# Patient Record
Sex: FEMALE | Race: WHITE | Employment: UNEMPLOYED | URBAN - METROPOLITAN AREA
[De-identification: names, ages, dates, MRNs, and addresses within clinical notes are randomized per-mention and may not be internally consistent; named-entity substitution may affect disease eponyms.]

---

## 2017-10-31 ENCOUNTER — TRANSFERRED RECORDS (OUTPATIENT)
Dept: HEALTH INFORMATION MANAGEMENT | Facility: CLINIC | Age: 52
End: 2017-10-31

## 2017-11-15 ENCOUNTER — OFFICE VISIT (OUTPATIENT)
Dept: NEUROLOGY | Facility: CLINIC | Age: 52
End: 2017-11-15

## 2017-11-15 VITALS
HEIGHT: 67 IN | RESPIRATION RATE: 20 BRPM | BODY MASS INDEX: 24.36 KG/M2 | DIASTOLIC BLOOD PRESSURE: 73 MMHG | WEIGHT: 155.2 LBS | HEART RATE: 88 BPM | SYSTOLIC BLOOD PRESSURE: 127 MMHG | OXYGEN SATURATION: 97 % | TEMPERATURE: 97.7 F

## 2017-11-15 DIAGNOSIS — R10.12 ABDOMINAL PAIN, LEFT UPPER QUADRANT: ICD-10-CM

## 2017-11-15 DIAGNOSIS — F41.9 ANXIETY: Primary | ICD-10-CM

## 2017-11-15 RX ORDER — CHOLECALCIFEROL (VITAMIN D3) 25 MCG
1 CAPSULE ORAL
COMMUNITY

## 2017-11-15 RX ORDER — IBUPROFEN 200 MG
400 TABLET ORAL EVERY 8 HOURS PRN
COMMUNITY
End: 2019-08-14

## 2017-11-15 ASSESSMENT — ENCOUNTER SYMPTOMS
WEIGHT LOSS: 0
DISTURBANCES IN COORDINATION: 0
LOSS OF CONSCIOUSNESS: 0
MYALGIAS: 0
POLYPHAGIA: 0
PALPITATIONS: 1
DIARRHEA: 1
PANIC: 1
NAUSEA: 0
EYE REDNESS: 1
DECREASED CONCENTRATION: 1
INSOMNIA: 1
ABDOMINAL PAIN: 0
HYPERTENSION: 0
WEIGHT GAIN: 0
HOT FLASHES: 0
MEMORY LOSS: 0
MUSCLE CRAMPS: 1
PARALYSIS: 0
SEIZURES: 0
NERVOUS/ANXIOUS: 1
WEAKNESS: 0
NUMBNESS: 0
HALLUCINATIONS: 0
CONSTIPATION: 1
DIZZINESS: 1
TREMORS: 0
ARTHRALGIAS: 0
EYE IRRITATION: 0
LIGHT-HEADEDNESS: 0
NECK PAIN: 0
VOMITING: 0
EYE WATERING: 0
SLEEP DISTURBANCES DUE TO BREATHING: 0
DECREASED LIBIDO: 0
BLOATING: 0
NIGHT SWEATS: 0
HYPOTENSION: 0
MUSCLE WEAKNESS: 0
FEVER: 0
CHILLS: 0
EXERCISE INTOLERANCE: 0
LEG PAIN: 0
DOUBLE VISION: 0
BLOOD IN STOOL: 0
EYE PAIN: 0
SYNCOPE: 0
JAUNDICE: 0
SPEECH CHANGE: 0
RECTAL PAIN: 0
POLYDIPSIA: 0
STIFFNESS: 1
BACK PAIN: 0
HEADACHES: 1
DECREASED APPETITE: 1
ORTHOPNEA: 0
INCREASED ENERGY: 1
HEARTBURN: 0
DEPRESSION: 0
JOINT SWELLING: 0
TINGLING: 1
FATIGUE: 1
ALTERED TEMPERATURE REGULATION: 0
BOWEL INCONTINENCE: 0

## 2017-11-15 ASSESSMENT — PAIN SCALES - GENERAL: PAINLEVEL: MILD PAIN (3)

## 2017-11-15 NOTE — NURSING NOTE
Chief Complaint   Patient presents with     Consult     UMP- MUSCLE SPASMS IN ABDOMINAL AREA AND BOTH LEGS     Jose Laureano, CMA

## 2017-11-15 NOTE — MR AVS SNAPSHOT
After Visit Summary   11/15/2017    America Woo    MRN: [id number]           Patient Information     Date Of Birth          1965        Visit Information        Provider Department      11/15/2017 2:15 PM Nancy Rhodes MD [facility] Neurology        Today's Diagnoses     Anxiety    -  1    Abdominal pain, left upper quadrant          Care Instructions    Schedule an appointment with Psychiatry.    Schedule an appointment with Gastroenterology.    Follow up in General Neurology clinic after these are completed.          Follow-ups after your visit        Additional Services     GASTROENTEROLOGY ADULT REF CONSULT ONLY       Preferred Location: [facility] [phone]      Please be aware that coverage of these services is subject to the terms and limitations of your health insurance plan.  Call member services at your health plan with any benefit or coverage questions.  Any procedures must be performed at a [facility] facility OR coordinated by your clinic's referral office.    Please bring the following with you to your appointment:    (1) Any X-Rays, CTs or MRIs which have been performed.  Contact the facility where they were done to arrange for  prior to your scheduled appointment.    (2) List of current medications   (3) This referral request   (4) Any documents/labs given to you for this referral            MENTAL HEALTH REFERRAL  - Adult; Psychiatry and Medication Management; Psychiatry       All scheduling is subject to the client's specific insurance plan & benefits, provider/location availability, and provider clinical specialities.  Please arrive 15 minutes early for your first appointment and bring your completed paperwork.    Please be aware that coverage of these services is subject to the terms and limitations of your health insurance plan.  Call member services at your health plan with any benefit or coverage questions.                  Follow-up notes from  "your care team     Return in about 4 months (around 3/15/2018).      Your next 10 appointments already scheduled     2018  3:30 PM CDT   (Arrive by 3:15 PM)   Return Visit with Nancy Rhodes MD   [facility] Neurology ([facility] [place] [facility])    [address] [place] [address] [id number] [phone]              Who to contact     Please call your clinic at [phone] to:    Ask questions about your health    Make or cancel appointments    Discuss your medicines    Learn about your test results    Speak to your doctor   If you have compliments or concerns about an experience at your clinic, or if you wish to file a complaint, please contact [facility] Patient Relations at [phone] or email us at [email or web address]         Additional Information About Your Visit        [employer] Information     [employer] is an electronic gateway that provides easy, online access to your medical records. With [employer], you can request a clinic appointment, read your test results, renew a prescription or communicate with your care team.     To sign up for [employer] visit the website at [email or web address]   You will be asked to enter the access code listed below, as well as some personal information. Please follow the directions to create your username and password.     Your access code is: [email or web address]  Expires: 2018  2:21 PM     Your access code will  in 90 days. If you need help or a new code, please contact your [facility] Clinic or call [phone] for assistance.        Care EveryWhere ID     This is your Care EveryWhere ID. This could be used by other organizations to access your [place] medical records  [id number]        Your Vitals Were     Pulse Temperature Respirations Height Pulse Oximetry Breastfeeding?    88 97.7  F (36.5  C) (Oral) 20 1.702 m (5' 7\") 97% No    BMI (Body Mass Index)             "       24.31 kg/m2            Blood Pressure from Last 3 Encounters:   11/15/17 127/73    Weight from Last 3 Encounters:   11/15/17 70.4 kg (155 lb 3.2 oz)              We Performed the Following     GASTROENTEROLOGY ADULT REF CONSULT ONLY     MENTAL HEALTH REFERRAL  - Adult; Psychiatry and Medication Management; Psychiatry        Primary Care Provider Office Phone # Fax #    Therese Oviedo [phone]       [place] [address] [address] [facility] [address]        Equal Access to Services     JOHNY HEAD : Hadii aad ku hadasho Soomaali, waaxda luqadaha, qaybta kaalmada adeegyada, waxay idiin hayaan adeeg patrickaragasper lakeyon . So [facility] [phone].    ATENCIÓN: Si habla español, tiene a negron disposición servicios gratuitos de asistencia lingüística. [facility] [phone].    We comply with applicable federal civil rights laws and [place] laws. We do not discriminate on the basis of race, color, national origin, age, disability, sex, sexual orientation, or gender identity.            Thank you!     Thank you for choosing [facility] NEUROLOGY  for your care. Our goal is always to provide you with excellent care. Hearing back from our patients is one way we can continue to improve our services. Please take a few minutes to complete the written survey that you may receive in the mail after your visit with us. Thank you!             Your Updated Medication List - Protect others around you: Learn how to safely use, store and throw away your medicines at www.disposemymeds.org.          This list is accurate as of: 11/15/17  4:36 PM.  Always use your most recent med list.                   Brand Name Dispense Instructions for use Diagnosis    ibuprofen 200 MG tablet    ADVIL/MOTRIN     Take 400 mg by mouth every 8 hours as needed for mild pain        MULTIVITAMIN & MINERAL PO      Take 1 tablet by mouth three times a week        Vitamin D-3 1000 UNITS Caps      Take 1 capsule by mouth four times a week

## 2017-11-15 NOTE — PROGRESS NOTES
"  DEPARTMENT OF NEUROLOGY    Patient Name:  America Woo  MRN:  [id number]    :  1965  Date of Clinic Visit:  2017  Primary Care Provider:  Therese Oviedo    CHIEF COMPLAINT: abdominal pain    HISTORY OF PRESENT ILLNESS:  America Woo is a 52 year old female with history of anxiety who presents to general neurology clinic for nine months of multiple symptoms.    She says that what she has noticed all started after she had the flu in January. She was not tested, but she has had it in the past and says it was the same and she spoke with her PCP on the phone and they thought this was the flu as well. After 2 weeks of that, in 2017 she experienced 3 weeks of stomach pain, of a spasming, burning quality. She was also very anxious, and had low back pain during this time that radiated to the right and around into her groin and abdomen. She thought at the time she had kidney stones, and says she was evaluated and found to have kidney stones, but she was not in the process of passing them. She then got shingles (in her R pubic region, R lower back, and R leg, all of which did not cross the midline, some of which blistered and crusted) then one month later, she had two small equivocal spots on her L shoulder which did not blister or crust. After this, she also experienced 4 individual instances of a feeling of \"electric shock\" that lasted seconds or even shorter, in back of her L arm. During this entire period, she says her bowel habits were normal, then when it all resolved she started to experience \"terrible constipation.\"    By summer 2017 she says she experienced vertigo (which she described as feeling of imbalance or drunkeness) with motion and activity, that would go away if she stopped whatever activity she was taking part in, and if she kept going, it would continue and she would then get a migraine. This was over the L eye, nonradiating, and she would be sensitive to " "fluorescent lights, and have a constant pressure type pain. This ended in September 2017, and she says she was not a person who had headaches prior.     Now, since November 5 she says she has had more stomach spasming pain, muscle spasms and twitches in both legs, R>L, between her ribs, in her arms, a heat sensation of L arm for a second at a time, and floaters in both of her eyes when showering. She says the spasming comes and goes, that she can feel well for many hours of the day, or she can wake up with it, or it will develop over the day, or sometimes it wakes her up at 4 am. She says she is now stooling ~4 times right away in am, soft, appearance is normal (no blood, dark, or tarry stools), and there is some relief of the spasmodic pain afterward. She denies any nausea or vomiting. She says the twitching has been constant for the past three days and is mostly around the medial aspect of her R knee. She also says she feels shaky all over since the knee twitching started.    She says she has been under a \"ton of stress\" lately and around and before the time of onset of these symptoms. She says her elderly mother has been very ill, she fell and broke her hip and pelvis, has an eating disorder, and her father is \"in denial\" about whether she does or needs to be in assisted living. The patient admits openly that she \"did not deal with [the stress] well\" but has stepped back and is allowing another sibling to take the main role of caregiver in this situation.     She describes her sleep as \"horrible\" and that it has been this way since a least January 2017. She says she lays down at 2100, falls asleep within 10 minutes, and then awakens after 1-2 hours, and repeats this every 1-2 hours all night, awakening around 4 times nightly, then wakes up at 0430, and does not feel rested. Her  says she is a light snorer, maybe, but usually not at all, they both deny any apneic spells. She says she has trouble getting " back to sleep in the middle of the night due to racing thoughts. She reports night terrors, since she was very small, that were better for many years, but have recurred since her sleep has been worse since 1/2017.     She says she is not in a good mood because she does not feel good. She says she always feels anxious, but more so since February. She reports taking doxepin in the 90s for about six months, but felt better after a period of time and was able to stop taking it. She reports panic attacks historically, and recently, maybe twice over the summer which are manifest as heart racing, dizziness, and feeling like she was going to die. She says she has been more fatigued in the past year, she realized that she goes to work, and goes to bed and that's it. She works as a  at a grocery store.    She was seen by her PCP regarding the vertigo and migraine, but these seemed to go away on their own. She was seen by ENT who recommended Neurology consultation, but did not go to that original appointment because the dizziness stopped. She was given a bite splint for bruxism.    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    Drinks rarely  No tobacco  No drugs    Mom- CMT (feet and legs), essential tremor, depression  Dad- stroke, polio, depression  5 siblings and a half brother, in good health  Three children, in good health    ASSESSMENT AND PLAN:  #abdominal pain  #paresthesias  #insomnia  #anxiety  No clear organic neurologic disorder can unify these problems, however untreated anxiety could be a common etiology. Also, consider mixed IBS with regard to GI symptoms.    Plan:  - Psychiatry consultation  - Gastroenterology consultation  - Follow up in General Neurology after the above have occurred.    Patient was seen and discussed with Dr. Duncan.    Nancy hRodes MD  PGY-2 Resident  [facility] Department of Neurology  Pager: [phone]    PHYSICAL EXAMINATION:  Vitals: /73   "Pulse 88  Temp 97.7  F (36.5  C) (Oral)  Resp 20  Ht 1.702 m (5' 7\")  Wt 70.4 kg (155 lb 3.2 oz)  SpO2 97%  Breastfeeding? No  BMI 24.31 kg/m2  General: adult female patient, seated on chair, NAD, accompanied by   HEENT: at/nc, oropharynx clear, mucosa moist  Cardiac: RRR, no m/r/g  Pulmonary: CTAB, nonlabored on RA  Abdomen: soft, nontender, nondistended, BS+  Extremities: warm, dry, w/o edema  Skin: no jaundice or rash  Psych: pleasant affect and congruent mood, thought content w/o abnormality, process linear and logical  Neuro: fundi benign   Mental status: alert and oriented to person, place, and time; language is fluent with intact comprehension and naming   Cranial nerves: VFF, PERRL, EOMI, no facial asymmetry, facial sensation intact, tongue and uvula are midline, no dysarthria   Motor: No abnormal posture, tone, atrophy, or movements/fasciculations.    Biceps Triceps Deltoid Hip flexor Knee extension Knee flexion Ankle extension Ankle flexion   Right 5 5 5 5 5 5 5 5 5   Left 5 5 5 5 5 5 5 5 5    Reflexes: Absent Babinski. Absent Clemons.   Brachioradialis Biceps Triceps Patellar Achilles   Right 2+ 2+ 2+ 2+ 2+   Left 2+ 2+ 2+ 2+ 2+    Sensory: Intact to light touch, pin prick, and vibration in all extremities. Romberg exam within normal limits.   Coordination: Intact FNF and heel-shin. No Dysmetria. No Dysdiadochokinesia.   Gait: Normal width, stride length, turn, and arm swing.    INVESTIGATIONS:   None obtained    REVIEW OF SYSTEMS: 12-point RoS negative except as per HPI.    ALLERGIES:  Allergies   Allergen Reactions     Epinephrine      Other reaction(s): Other  Shaking violenly.     Pollen Extract      MEDICATIONS:  Current Outpatient Prescriptions   Medication Sig Dispense Refill     Multiple Vitamins-Minerals (MULTIVITAMIN & MINERAL PO) Take 1 tablet by mouth three times a week       Cholecalciferol (VITAMIN D-3) 1000 UNITS CAPS Take 1 capsule by mouth four times a week       " ibuprofen (ADVIL/MOTRIN) 200 MG tablet Take 400 mg by mouth every 8 hours as needed for mild pain       PAST MEDICAL HISTORY:  No past medical history on file.  PAST SURGICAL HISTORY:  No past surgical history on file.  SOCIAL HISTORY:  Social History     Social History     Marital status:      Spouse name: N/A     Number of children: N/A     Years of education: N/A     Occupational History     Not on file.     Social History Main Topics     Smoking status: Never Smoker     Smokeless tobacco: Never Used     Alcohol use 1.2 oz/week     2 Standard drinks or equivalent per week      Comment: MONTHLY     Drug use: No     Sexual activity: Yes     Partners: Male     Other Topics Concern     Not on file     Social History Narrative     No narrative on file     FAMILY HISTORY:  No family history on file.              Neurology Attending Note:    I have seen and examined the patient with the resident.  I have reviewed the labs and imaging results available.  I agree with the assessment and plan.    Lex Duncan MD

## 2017-11-15 NOTE — LETTER
"11/15/2017       RE: America Woo  [address] [facility] [address]     Dear Colleague,    Thank you for referring your patient, America Woo, to the [facility] NEUROLOGY at [facility]. Please see a copy of my visit note below.      DEPARTMENT OF NEUROLOGY    Patient Name:  America Woo  MRN:  [id number]    :  1965  Date of Clinic Visit:  2017  Primary Care Provider:  Therese Oviedo    CHIEF COMPLAINT: abdominal pain    HISTORY OF PRESENT ILLNESS:  America Woo is a 52 year old female with history of anxiety who presents to general neurology clinic for nine months of multiple symptoms.    She says that what she has noticed all started after she had the flu in January. She was not tested, but she has had it in the past and says it was the same and she spoke with her PCP on the phone and they thought this was the flu as well. After 2 weeks of that, in 2017 she experienced 3 weeks of stomach pain, of a spasming, burning quality. She was also very anxious, and had low back pain during this time that radiated to the right and around into her groin and abdomen. She thought at the time she had kidney stones, and says she was evaluated and found to have kidney stones, but she was not in the process of passing them. She then got shingles (in her R pubic region, R lower back, and R leg, all of which did not cross the midline, some of which blistered and crusted) then one month later, she had two small equivocal spots on her L shoulder which did not blister or crust. After this, she also experienced 4 individual instances of a feeling of \"electric shock\" that lasted seconds or even shorter, in back of her L arm. During this entire period, she says her bowel habits were normal, then when it all resolved she started to experience \"terrible constipation.\"    By summer 2017 she says she experienced vertigo (which she described as feeling of " "imbalance or drunkeness) with motion and activity, that would go away if she stopped whatever activity she was taking part in, and if she kept going, it would continue and she would then get a migraine. This was over the L eye, nonradiating, and she would be sensitive to fluorescent lights, and have a constant pressure type pain. This ended in September 2017, and she says she was not a person who had headaches prior.     Now, since November 5 she says she has had more stomach spasming pain, muscle spasms and twitches in both legs, R>L, between her ribs, in her arms, a heat sensation of L arm for a second at a time, and floaters in both of her eyes when showering. She says the spasming comes and goes, that she can feel well for many hours of the day, or she can wake up with it, or it will develop over the day, or sometimes it wakes her up at 4 am. She says she is now stooling ~4 times right away in am, soft, appearance is normal (no blood, dark, or tarry stools), and there is some relief of the spasmodic pain afterward. She denies any nausea or vomiting. She says the twitching has been constant for the past three days and is mostly around the medial aspect of her R knee. She also says she feels shaky all over since the knee twitching started.    She says she has been under a \"ton of stress\" lately and around and before the time of onset of these symptoms. She says her elderly mother has been very ill, she fell and broke her hip and pelvis, has an eating disorder, and her father is \"in denial\" about whether she does or needs to be in assisted living. The patient admits openly that she \"did not deal with [the stress] well\" but has stepped back and is allowing another sibling to take the main role of caregiver in this situation.     She describes her sleep as \"horrible\" and that it has been this way since a least January 2017. She says she lays down at 2100, falls asleep within 10 minutes, and then awakens after 1-2 " hours, and repeats this every 1-2 hours all night, awakening around 4 times nightly, then wakes up at 0430, and does not feel rested. Her  says she is a light snorer, maybe, but usually not at all, they both deny any apneic spells. She says she has trouble getting back to sleep in the middle of the night due to racing thoughts. She reports night terrors, since she was very small, that were better for many years, but have recurred since her sleep has been worse since 1/2017.     She says she is not in a good mood because she does not feel good. She says she always feels anxious, but more so since February. She reports taking doxepin in the 90s for about six months, but felt better after a period of time and was able to stop taking it. She reports panic attacks historically, and recently, maybe twice over the summer which are manifest as heart racing, dizziness, and feeling like she was going to die. She says she has been more fatigued in the past year, she realized that she goes to work, and goes to bed and that's it. She works as a  at a grocery store.    She was seen by her PCP regarding the vertigo and migraine, but these seemed to go away on their own. She was seen by ENT who recommended Neurology consultation, but did not go to that original appointment because the dizziness stopped. She was given a bite splint for bruxism.    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    Drinks rarely  No tobacco  No drugs    Mom- CMT (feet and legs), essential tremor, depression  Dad- stroke, polio, depression  5 siblings and a half brother, in good health  Three children, in good health    ASSESSMENT AND PLAN:  #abdominal pain  #paresthesias  #insomnia  #anxiety  No clear organic neurologic disorder can unify these problems, however untreated anxiety could be a common etiology. Also, consider mixed IBS with regard to GI symptoms.    Plan:  - Psychiatry consultation  - Gastroenterology  "consultation  - Follow up in General Neurology after the above have occurred.    Patient was seen and discussed with Dr. Duncan.    Nancy Rhodes MD  PGY-2 Resident  [facility] Department of Neurology  Pager: [phone]    PHYSICAL EXAMINATION:  Vitals: /73  Pulse 88  Temp 97.7  F (36.5  C) (Oral)  Resp 20  Ht 1.702 m (5' 7\")  Wt 70.4 kg (155 lb 3.2 oz)  SpO2 97%  Breastfeeding? No  BMI 24.31 kg/m2  General: adult female patient, seated on chair, NAD, accompanied by   HEENT: at/nc, oropharynx clear, mucosa moist  Cardiac: RRR, no m/r/g  Pulmonary: CTAB, nonlabored on RA  Abdomen: soft, nontender, nondistended, BS+  Extremities: warm, dry, w/o edema  Skin: no jaundice or rash  Psych: pleasant affect and congruent mood, thought content w/o abnormality, process linear and logical  Neuro: fundi benign   Mental status: alert and oriented to person, place, and time; language is fluent with intact comprehension and naming   Cranial nerves: VFF, PERRL, EOMI, no facial asymmetry, facial sensation intact, tongue and uvula are midline, no dysarthria   Motor: No abnormal posture, tone, atrophy, or movements/fasciculations.    Biceps Triceps Deltoid Hip flexor Knee extension Knee flexion Ankle extension Ankle flexion   Right 5 5 5 5 5 5 5 5 5   Left 5 5 5 5 5 5 5 5 5    Reflexes: Absent Babinski. Absent Clemons.   Brachioradialis Biceps Triceps Patellar Achilles   Right 2+ 2+ 2+ 2+ 2+   Left 2+ 2+ 2+ 2+ 2+    Sensory: Intact to light touch, pin prick, and vibration in all extremities. Romberg exam within normal limits.   Coordination: Intact FNF and heel-shin. No Dysmetria. No Dysdiadochokinesia.   Gait: Normal width, stride length, turn, and arm swing.    INVESTIGATIONS:   None obtained    REVIEW OF SYSTEMS: 12-point RoS negative except as per HPI.    ALLERGIES:  Allergies   Allergen Reactions     Epinephrine      Other reaction(s): Other  Shaking violenly.     Pollen Extract  "     MEDICATIONS:  Current Outpatient Prescriptions   Medication Sig Dispense Refill     Multiple Vitamins-Minerals (MULTIVITAMIN & MINERAL PO) Take 1 tablet by mouth three times a week       Cholecalciferol (VITAMIN D-3) 1000 UNITS CAPS Take 1 capsule by mouth four times a week       ibuprofen (ADVIL/MOTRIN) 200 MG tablet Take 400 mg by mouth every 8 hours as needed for mild pain       PAST MEDICAL HISTORY:  No past medical history on file.  PAST SURGICAL HISTORY:  No past surgical history on file.  SOCIAL HISTORY:  Social History     Social History     Marital status:      Spouse name: N/A     Number of children: N/A     Years of education: N/A     Occupational History     Not on file.     Social History Main Topics     Smoking status: Never Smoker     Smokeless tobacco: Never Used     Alcohol use 1.2 oz/week     2 Standard drinks or equivalent per week      Comment: MONTHLY     Drug use: No     Sexual activity: Yes     Partners: Male     Other Topics Concern     Not on file     Social History Narrative     No narrative on file     FAMILY HISTORY:  No family history on file.    Neurology Attending Note:    I have seen and examined the patient with the resident.  I have reviewed the labs and imaging results available.  I agree with the assessment and plan.    Lex Duncan MD

## 2017-11-15 NOTE — PATIENT INSTRUCTIONS
Schedule an appointment with Psychiatry.    Schedule an appointment with Gastroenterology.    Follow up in General Neurology clinic after these are completed.

## 2017-12-19 ENCOUNTER — TRANSFERRED RECORDS (OUTPATIENT)
Dept: MULTI SPECIALTY CLINIC | Facility: CLINIC | Age: 52
End: 2017-12-19

## 2018-07-24 NOTE — TELEPHONE ENCOUNTER
FUTURE VISIT INFORMATION      FUTURE VISIT INFORMATION:    Date: 7/30/18    Time:     Location:   REFERRAL INFORMATION:    Referring provider:  SELF REFERRED    Referring providers clinic:  PT SEEN AT [facility]    Reason for visit/diagnosis  CHRONIC HEADACHES    RECORDS STATUS      RECORDS IN CARE EVERYWHERE, PT HAS ONLY SEEN PRIMARY CARE.  MRI, MRA AND CT OF BRAIN IN PACS

## 2018-07-30 ENCOUNTER — PRE VISIT (OUTPATIENT)
Dept: NEUROLOGY | Facility: CLINIC | Age: 53
End: 2018-07-30

## 2018-07-31 ENCOUNTER — OFFICE VISIT (OUTPATIENT)
Dept: NEUROLOGY | Facility: CLINIC | Age: 53
End: 2018-07-31
Payer: COMMERCIAL

## 2018-07-31 VITALS
BODY MASS INDEX: 23.54 KG/M2 | HEART RATE: 92 BPM | DIASTOLIC BLOOD PRESSURE: 59 MMHG | SYSTOLIC BLOOD PRESSURE: 122 MMHG | HEIGHT: 67 IN | WEIGHT: 150 LBS

## 2018-07-31 DIAGNOSIS — G43.719 INTRACTABLE CHRONIC MIGRAINE WITHOUT AURA AND WITHOUT STATUS MIGRAINOSUS: Primary | ICD-10-CM

## 2018-07-31 DIAGNOSIS — F41.9 ANXIETY: ICD-10-CM

## 2018-07-31 RX ORDER — NORTRIPTYLINE HCL 10 MG
10 CAPSULE ORAL AT BEDTIME
Qty: 30 CAPSULE | Refills: 11 | Status: SHIPPED | OUTPATIENT
Start: 2018-07-31 | End: 2019-08-14

## 2018-07-31 RX ORDER — RIZATRIPTAN BENZOATE 10 MG/1
10 TABLET ORAL
Qty: 9 TABLET | Refills: 3 | Status: SHIPPED | OUTPATIENT
Start: 2018-07-31 | End: 2019-08-14

## 2018-07-31 NOTE — PATIENT INSTRUCTIONS
Keep headache journal of your symptoms. Try to track how often you have a headache, how long it lasts and what medicines you used. You can also track severity of headache    You can use smart phone apps: my migraine enrique or use paper.     When you use a triptan medicine (rizatriptan)  take as soon as you notice the migraine begins. You can take a second dose 2 hours after the first dose if you still have any migraine symptoms    It is fine to take 600 mg of ibuprofen (Advil) or 440 mg of naproxen (Aleve) with the triptan medicine    Try not to use triptan medicines more than 2 days a week    Lifestyle changes you can make to help your headaches:  1. Try to drink enough water each day (48-70 fluid ounces a day)  2. Limit caffeine intact-one caffeinated beverage a day  3. Eat regular meals  4. Try to get cardiovascular exercise (walking, swimming, biking) 4-5 times a week  5. Try to have a routine sleep schedule going to bed at the same time each night and getting up the same time each morning with enough time to sleep in between  6. Sometimes foods can trigger migraines you can try to eliminate them if you think they make symptoms worse: dairy, gluten, nuts (cashews, almonds), artificial sweeteners, artificial colors, high sugar content foods, certain alcohol, chocolate.    To learn more about headaches you can go to the following websites:  https://americanmigrainefoundation.org/  http://www.headaches.org/

## 2018-07-31 NOTE — LETTER
7/31/2018       RE: America Woo  [address] [facility] [address]     Dear Colleague,    Thank you for referring your patient, America Woo, to the [facility] NEUROLOGY at [facility]. Please see a copy of my visit note below.        [facility]    America Woo MRN# [id number]   Age: 52 year old YOB: 1965     Requesting physician: Referred Self  Therese Oviedo     Chief Complaint:  Self referred to discuss abnormal MRI /MRA    History of Present Illness:  America is a 52-year-old woman who has self-referred herself to the [facility] to discuss concerns over an MRI scan of the brain that was recently performed through the [facility].    She is accompanied to the appointment by her .  Their understanding is she had an MRA angiography that suggested an aneurysm as well as a blood vessel in [place] meant.    This is not the first time the patient has been seen in this department.  In November 2017 the patient was seen by Dr. Rhodes, one of the neurology residents, for assessment of complaints of dizziness.  The patient was describing dizziness that was triggered by exercise it could last up to 2 days and would then turn into a significant migraine with left-sided pain and light sensitivity.  At the time the patient was felt to be somewhat anxious and recommended to see psychiatry.  The patient was also recommended to see gastroenterology due to some stomach complaints.  The patient never followed through with a psychiatric referral.  It does not appear to me that any migraine management was offered at that time.    The patient reports that those intermittent episodes of dizziness triggered into a migraine headache have become less severe but over the summer she has had some new symptoms.  She reports that she has a left frontal head region pain that is worse with coughing or bending over.  It is a severe pain over the  left side of her head for about 15 minutes and then a lesser pain is present fairly constantly.  The pain also seems to be triggered by working under fluorescent lights.  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    The patient was seen by her primary care provider for this new type of headache and was sent for imaging.  MRA angiography of the head images were reviewed today.  Initial imaging was performed on July 13.  The left MCA blood vessel is tortuous.  This is mainly involving the left M2 segment.  The patient and her  came to understanding this meant that the blood vessel was entangled in the brain rather than the fact that it just had a more circuitous pathway.  In addition the radiologist to read this reports that because of the tortuosity a small aneurysm in this M2 area could not be completely excluded and recommended CT angiography of the brain.CT angiography images were also reviewed.  This was performed on July 20, 2018.  There is no evidence of any aneurysm.    All of these findings were explained to the patient and her  today.    Of note the patient has a significant amount of stress.  Apparently at baseline she tends to be someone prone to anxiety.  Her  reports that the anxiety has been increased over the past couple of years.  The patient's mother has anorexia that is not being successfully treated and this past week her mother went on hospice care because she is refusing to eat.  Obviously this is a lot of stress and her  is clearly inclined to think this is contributing to her headaches.  Her  also mentions that essentially she complains of a headache more days than not and feels that she might be minimizing her baseline headache problems on today's history.  When confronted with this piece of information the patient acknowledges that she does live with more low-grade headaches that she does  not particularly think about treating but certainly has limited her lifestyle.  She often does not go out or limits her exposure to fluorescent lights.    On review it does appear that indomethacin had been ordered for this head pain on the left side.  Apparently insurance did not pay for this and the patient has not tried it.    Past medical history significant for some anxiety and lumbago.  History reviewed. No pertinent past medical history.    There is no problem list on file for this patient.      History reviewed. No pertinent surgical history.  Past surgical history includes removal of benign tumor from the back and a D&C in 1993.    Social History     Social History     Marital status:      Spouse name: N/A     Number of children: N/A     Years of education: N/A     Occupational History     Not on file.     Social History Main Topics     Smoking status: Never Smoker     Smokeless tobacco: Never Used     Alcohol use 1.2 oz/week     2 Standard drinks or equivalent per week      Comment: MONTHLY     Drug use: No     Sexual activity: Yes     Partners: Male     Other Topics Concern     Not on file     Social History Narrative       History reviewed. No pertinent family history.   The patient's mother has anorexia.  Mother has depression and anxiety.  There is a question whether the patient's mother has Charcot-Daksha-Tooth in the chart and essential tremor.  Maternal grandmother had Alzheimer's disease.    Current Outpatient Prescriptions   Medication Sig     Cholecalciferol (VITAMIN D-3) 1000 UNITS CAPS Take 1 capsule by mouth four times a week     ibuprofen (ADVIL/MOTRIN) 200 MG tablet Take 400 mg by mouth every 8 hours as needed for mild pain     Multiple Vitamins-Minerals (MULTIVITAMIN & MINERAL PO) Take 1 tablet by mouth three times a week     No current facility-administered medications for this visit.           Allergies   Allergen Reactions     Epinephrine      Other reaction(s): Other  Shaking  "violenly.     Pollen Extract        ROS: Please see HPI all other systems review and negative.    Physical Examination:  /59  Pulse 92  Ht 1.702 m (5' 7\")  Wt 68 kg (150 lb)  BMI 23.49 kg/m2  General Appearance:  The patient is somewhat anxious throughout the interview but otherwise in no acute distress.  She is cooperative with examination  Neurological Examination  Cognition: oriented x3, attention and recall intact. No aphasia or dysarthria  Cranial Nerves: 2-12 intact. Funduscopic examination is normal with sharp disc margins bilaterally.  No nystagmus is present  General Motor Survey: Normal muscle bulk, tone and strength in all four ext. No tremor  Coordination: Finger to nose and heel knee shin normal bilaterally. Normal alternating movements.  Reflexes: Upper and lower extremity reflexes are within normal limits (+2) and bilaterally symmetric.   Sensory Examination:   Vibration: normal in all four ext     Gait: Normal gait which is stable on turns. Normal arm swing. Romberg negative.    Cardiovascular Examination:  Heart is regular in rate and rhythm to auscultation. No significant murmurs. No carotid bruits. No significant peripheral edema. Pedal pulses are palpable bilaterally.     Musculoskeletal Examination:  Neck is supple with full range of motion. No tenderness to palpation.      Investigations:  See HPI for descriptive information regarding MRA angiography and CT angiography    Impression/Recommendations:  1.  Chronic migraine headaches  2.  Anxiety  3.  Abnormal MRA angiography with NO evidence of aneurysm only tortuosity of the MCA    I met with the patient for over 60 minutes today over 50% counseling.  Essentially she was here to discuss an abnormal MRA angiography scan.  Images were reviewed with the patient and her .  The tortuosity is felt to be a normal variant and is not responsible for any of her current symptoms.  I reassured her that she has no blood vessels that are " entangled with her brain nor does she have any sign of aneurysm.  I do not believe this needs further workup.  If it creates significant levels of anxiety for her a repeat MR angiogram or CT angiogram in one years time could be considered.    I think the major problem for the patient is anxiety and a tendency towards migraine headaches.  I think she has some tendency to somaticize and I expect the anxiety around her mother's illness and entry into hospice has created an increased amount of headaches.  Having said that she certainly meets criteria for chronic migraine and I think she would benefit from migraine prevention as well as appropriate migraine abortive treatment to control the symptoms.  I think this might also allow her to pursue some anxiety management which would be very helpful and has been previously recommended in 2017.    The patient voices substantial anxiety even about taking medications.  I prescribed nortriptyline as a preventive migraine medicine and rizatriptan to be used as needed.  I thoroughly reviewed the side effects and what to expect.  I also explained to her that these medications are felt to be safe and appropriate to use in this situation of having more headache days than not.  I hope the patient moves forward and tries to manage her health anxiety with some aid from the therapist.  I have asked the patient to return in 3 months time to review to make sure headache treatment is improving.    Анна Raza MD FAAN  Department of Neurology  Pager [phone]

## 2018-07-31 NOTE — PROGRESS NOTES
[facility]    America Woo MRN# [id number]   Age: 52 year old YOB: 1965     Requesting physician: Referred Self  Therese Oviedo     Chief Complaint:  Self referred to discuss abnormal MRI /MRA    History of Present Illness:  America is a 52-year-old woman who has self-referred herself to the [facility] to discuss concerns over an MRI scan of the brain that was recently performed through the [facility].    She is accompanied to the appointment by her .  Their understanding is she had an MRA angiography that suggested an aneurysm as well as a blood vessel in [place] meant.    This is not the first time the patient has been seen in this department.  In November 2017 the patient was seen by Dr. Rhodes, one of the neurology residents, for assessment of complaints of dizziness.  The patient was describing dizziness that was triggered by exercise it could last up to 2 days and would then turn into a significant migraine with left-sided pain and light sensitivity.  At the time the patient was felt to be somewhat anxious and recommended to see psychiatry.  The patient was also recommended to see gastroenterology due to some stomach complaints.  The patient never followed through with a psychiatric referral.  It does not appear to me that any migraine management was offered at that time.    The patient reports that those intermittent episodes of dizziness triggered into a migraine headache have become less severe but over the summer she has had some new symptoms.  She reports that she has a left frontal head region pain that is worse with coughing or bending over.  It is a severe pain over the left side of her head for about 15 minutes and then a lesser pain is present fairly constantly.  The pain also seems to be triggered by working under fluorescent lights.  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    The patient was seen by her primary care provider for this new type of headache and was sent for imaging.  MRA angiography of the head images were reviewed today.  Initial imaging was performed on July 13.  The left MCA blood vessel is tortuous.  This is mainly involving the left M2 segment.  The patient and her  came to understanding this meant that the blood vessel was entangled in the brain rather than the fact that it just had a more circuitous pathway.  In addition the radiologist to read this reports that because of the tortuosity a small aneurysm in this M2 area could not be completely excluded and recommended CT angiography of the brain.CT angiography images were also reviewed.  This was performed on July 20, 2018.  There is no evidence of any aneurysm.    All of these findings were explained to the patient and her  today.    Of note the patient has a significant amount of stress.  Apparently at baseline she tends to be someone prone to anxiety.  Her  reports that the anxiety has been increased over the past couple of years.  The patient's mother has anorexia that is not being successfully treated and this past week her mother went on hospice care because she is refusing to eat.  Obviously this is a lot of stress and her  is clearly inclined to think this is contributing to her headaches.  Her  also mentions that essentially she complains of a headache more days than not and feels that she might be minimizing her baseline headache problems on today's history.  When confronted with this piece of information the patient acknowledges that she does live with more low-grade headaches that she does not particularly think about treating but certainly has limited her lifestyle.  She often does not go out or limits her exposure to fluorescent lights.    On review it does appear that indomethacin had been ordered for this head pain on the left side.  Apparently insurance did  "not pay for this and the patient has not tried it.    Past medical history significant for some anxiety and lumbago.  History reviewed. No pertinent past medical history.    There is no problem list on file for this patient.      History reviewed. No pertinent surgical history.  Past surgical history includes removal of benign tumor from the back and a D&C in 1993.    Social History     Social History     Marital status:      Spouse name: N/A     Number of children: N/A     Years of education: N/A     Occupational History     Not on file.     Social History Main Topics     Smoking status: Never Smoker     Smokeless tobacco: Never Used     Alcohol use 1.2 oz/week     2 Standard drinks or equivalent per week      Comment: MONTHLY     Drug use: No     Sexual activity: Yes     Partners: Male     Other Topics Concern     Not on file     Social History Narrative       History reviewed. No pertinent family history.   The patient's mother has anorexia.  Mother has depression and anxiety.  There is a question whether the patient's mother has Charcot-Daksha-Tooth in the chart and essential tremor.  Maternal grandmother had Alzheimer's disease.    Current Outpatient Prescriptions   Medication Sig     Cholecalciferol (VITAMIN D-3) 1000 UNITS CAPS Take 1 capsule by mouth four times a week     ibuprofen (ADVIL/MOTRIN) 200 MG tablet Take 400 mg by mouth every 8 hours as needed for mild pain     Multiple Vitamins-Minerals (MULTIVITAMIN & MINERAL PO) Take 1 tablet by mouth three times a week     No current facility-administered medications for this visit.           Allergies   Allergen Reactions     Epinephrine      Other reaction(s): Other  Shaking violenly.     Pollen Extract        ROS: Please see HPI all other systems review and negative.    Physical Examination:  /59  Pulse 92  Ht 1.702 m (5' 7\")  Wt 68 kg (150 lb)  BMI 23.49 kg/m2  General Appearance:  The patient is somewhat anxious throughout the interview " but otherwise in no acute distress.  She is cooperative with examination  Neurological Examination  Cognition: oriented x3, attention and recall intact. No aphasia or dysarthria  Cranial Nerves: 2-12 intact. Funduscopic examination is normal with sharp disc margins bilaterally.  No nystagmus is present  General Motor Survey: Normal muscle bulk, tone and strength in all four ext. No tremor  Coordination: Finger to nose and heel knee shin normal bilaterally. Normal alternating movements.  Reflexes: Upper and lower extremity reflexes are within normal limits (+2) and bilaterally symmetric.   Sensory Examination:   Vibration: normal in all four ext     Gait: Normal gait which is stable on turns. Normal arm swing. Romberg negative.    Cardiovascular Examination:  Heart is regular in rate and rhythm to auscultation. No significant murmurs. No carotid bruits. No significant peripheral edema. Pedal pulses are palpable bilaterally.     Musculoskeletal Examination:  Neck is supple with full range of motion. No tenderness to palpation.      Investigations:  See HPI for descriptive information regarding MRA angiography and CT angiography    Impression/Recommendations:  1.  Chronic migraine headaches  2.  Anxiety  3.  Abnormal MRA angiography with NO evidence of aneurysm only tortuosity of the MCA    I met with the patient for over 60 minutes today over 50% counseling.  Essentially she was here to discuss an abnormal MRA angiography scan.  Images were reviewed with the patient and her .  The tortuosity is felt to be a normal variant and is not responsible for any of her current symptoms.  I reassured her that she has no blood vessels that are entangled with her brain nor does she have any sign of aneurysm.  I do not believe this needs further workup.  If it creates significant levels of anxiety for her a repeat MR angiogram or CT angiogram in one years time could be considered.    I think the major problem for the patient  is anxiety and a tendency towards migraine headaches.  I think she has some tendency to somaticize and I expect the anxiety around her mother's illness and entry into hospice has created an increased amount of headaches.  Having said that she certainly meets criteria for chronic migraine and I think she would benefit from migraine prevention as well as appropriate migraine abortive treatment to control the symptoms.  I think this might also allow her to pursue some anxiety management which would be very helpful and has been previously recommended in 2017.    The patient voices substantial anxiety even about taking medications.  I prescribed nortriptyline as a preventive migraine medicine and rizatriptan to be used as needed.  I thoroughly reviewed the side effects and what to expect.  I also explained to her that these medications are felt to be safe and appropriate to use in this situation of having more headache days than not.  I hope the patient moves forward and tries to manage her health anxiety with some aid from the therapist.  I have asked the patient to return in 3 months time to review to make sure headache treatment is improving.    Анна Raza MD [facility]  Department of Neurology  Pager [phone]

## 2019-08-14 ENCOUNTER — OFFICE VISIT (OUTPATIENT)
Dept: FAMILY MEDICINE | Facility: CLINIC | Age: 54
End: 2019-08-14
Payer: COMMERCIAL

## 2019-08-14 ENCOUNTER — ANCILLARY PROCEDURE (OUTPATIENT)
Dept: GENERAL RADIOLOGY | Facility: CLINIC | Age: 54
End: 2019-08-14
Attending: INTERNAL MEDICINE
Payer: COMMERCIAL

## 2019-08-14 VITALS
HEIGHT: 67 IN | DIASTOLIC BLOOD PRESSURE: 66 MMHG | RESPIRATION RATE: 15 BRPM | TEMPERATURE: 97.8 F | BODY MASS INDEX: 24.06 KG/M2 | SYSTOLIC BLOOD PRESSURE: 116 MMHG | OXYGEN SATURATION: 99 % | WEIGHT: 153.3 LBS | HEART RATE: 93 BPM

## 2019-08-14 DIAGNOSIS — M25.529 ELBOW PAIN: ICD-10-CM

## 2019-08-14 DIAGNOSIS — R26.89 DECREASED MOBILITY: ICD-10-CM

## 2019-08-14 DIAGNOSIS — M25.521 RIGHT ELBOW PAIN: Primary | ICD-10-CM

## 2019-08-14 PROCEDURE — 99203 OFFICE O/P NEW LOW 30 MIN: CPT | Performed by: INTERNAL MEDICINE

## 2019-08-14 PROCEDURE — 73080 X-RAY EXAM OF ELBOW: CPT | Mod: RT

## 2019-08-14 ASSESSMENT — MIFFLIN-ST. JEOR: SCORE: 1322.99

## 2019-08-14 NOTE — PATIENT INSTRUCTIONS
Patient Education     Understanding Lateral Epicondylitis    Tendons are strong bands of tissue that connect muscles to bones. Lateral epicondylitis affects the tendons that connect muscles in the forearm to the lateral epicondyle. This is the bony knob on the outer side of the elbow. The condition occurs if the extensor tendons of the wrist become red and swollen (irritated). This can cause pain in the elbow, forearm, and wrist. Because the condition is sometimes caused by playing tennis, it is also known as  tennis elbow.   How to say it  LA-tuhr-gamal [id number]   What causes lateral epicondylitis?  The condition most often occurs because of overuse. This can be from any activity that repeatedly puts stress on the forearm extensor muscles or tendons and wrist. For instance, playing tennis, lifting weights, cutting meat, painting, and typing can all cause the condition. Wear and tear of the tendons from aging or an injury to the tendons can also cause the condition.  Symptoms of lateral epicondylitis  The most common symptom is pain. You may feel it on the outer side of the elbow and down the back of the forearm. It may be worse when moving or using the elbow, forearm, or wrist. You may also feel pain when gripping or lifting things.  Treatment for lateral epicondylitis  Treatments may include:    Resting the elbow, forearm, and wrist. You ll need to avoid movements that can make your symptoms worse. You also may need to avoid certain sports and types of work for a time. This helps relieve symptoms and prevent further damage to the tendons.    Changing the action that caused the problem. For instance, if the tendons were damaged from playing tennis, it may help to change your playing technique or use different equipment. This helps prevent further damage to the tendons.    Using cold packs. Putting an ice pack on the injured area can help reduce pain and swelling.    Taking pain medicines. Taking  prescription or over-the-counter pain medicines may help reduce pain and swelling.      Wearing a brace. This helps reduce strain on the muscles and tendons in the forearm, which may relieve symptoms. It is very important to wear the brace properly.    Doing exercises and physical therapy. These help improve strength and range of motion in the elbow, forearm, and wrist.    Getting shots of medicine into the injured area. These may help relieve symptoms for a time.    Having surgery. This may be an option if other treatments fail to relieve symptoms. In many cases, the surgeon removes the damaged tissue.  Possible complications of lateral epicondylitis  If the tendons involved don t heal properly, symptoms may return or get worse. To help prevent this, follow your treatment plan as directed.  When to call your healthcare provider  Call your healthcare provider right away if you have any of these:    Fever of 100.4 F (38 C) or higher, or as directed    Redness, swelling, or warmth in the elbow or forearm that gets worse    Symptoms that don t get better with treatment, or get worse    New symptoms   Date Last Reviewed: 3/10/2016    4065-9045 [employer]. [address] [place] [address], [place] [address]. All rights reserved. This information is not intended as a substitute for professional medical care. Always follow your healthcare professional's instructions.

## 2019-08-14 NOTE — PROGRESS NOTES
Subjective     America Woo is a 53 year old female who presents to clinic today for the following health issues:    HPI   Joint Pain    Onset: Mid May with a fall    Description:   Location: right elbow  Character: Dull ache, Burning and stinging   (increased pain with movement and lifting objects.)    Intensity: moderate    Progression of Symptoms: worse    Accompanying Signs & Symptoms:  Other symptoms: radiation of pain to forearm    History:   Previous similar pain: no       Precipitating factors:   Trauma or overuse: YES- fell and hit her arm on a rock    Alleviating factors:  Improved by: rest/inactivity    Therapies Tried and outcome: states is less painful when she is not using it ..           There is no problem list on file for this patient.    History reviewed. No pertinent surgical history.    Social History     Tobacco Use     Smoking status: Never Smoker     Smokeless tobacco: Never Used   Substance Use Topics     Alcohol use: Yes     Alcohol/week: 2.0 standard drinks     Types: 2 Standard drinks or equivalent per week     Comment: MONTHLY     Family History   Problem Relation Age of Onset     Cerebrovascular Disease Father          Current Outpatient Medications   Medication Sig Dispense Refill     acyclovir (ZOVIRAX) 800 MG tablet Take 1 tablet (800 mg) by mouth 5 times daily for 7 days 35 tablet 0     Cholecalciferol (VITAMIN D-3) 1000 UNITS CAPS Take 1 capsule by mouth four times a week       Allergies   Allergen Reactions     Apple Other (See Comments)     Bee Pollen Other (See Comments)     Epinephrine      Other reaction(s): Other  Shaking violenly.     Pollen Extract      No lab results found.   BP Readings from Last 3 Encounters:       08/14/19 116/66   07/31/18 122/59    Wt Readings from Last 3 Encounters:       09/17/19 69.4 kg (152 lb 15.4 oz)   08/14/19 69.5 kg (153 lb 4.8 oz)              Reviewed and updated as needed this visit by Provider  Tobacco  Allergies  Meds  Problems   "Med Hx  Surg Hx  Fam Hx         Review of Systems   ROS COMP: CONSTITUTIONAL: NEGATIVE for fever, chills, change in weight  RESP: NEGATIVE for significant cough or SOB  CV: NEGATIVE for chest pain, palpitations or peripheral edema  GI: NEGATIVE for nausea, abdominal pain, heartburn, or change in bowel habits  MUSCULOSKELETAL: see above  NEURO: see above  ENDOCRINE: NEGATIVE for temperature intolerance, skin/hair changes  HEME/ALLERGY/IMMUNE: NEGATIVE for bleeding problems  PSYCHIATRIC: NEGATIVE for changes in mood or affect      Objective    /66   Pulse 93   Temp 97.8  F (36.6  C) (Oral)   Resp 15   Ht 1.702 m (5' 7\")   Wt 69.5 kg (153 lb 4.8 oz)   SpO2 99%   BMI 24.01 kg/m    Body mass index is 24.01 kg/m .  Physical Exam   GENERAL: healthy, alert and no distress  NECK: no adenopathy, no asymmetry, masses, or scars and thyroid normal to palpation  RESP: lungs clear to auscultation - no rales, rhonchi or wheezes  CV: regular rate and rhythm, normal S1 S2,  no peripheral edema and peripheral pulses strong  ABDOMEN: soft, nontender, no hepatosplenomegaly, no masses and bowel sounds normal  MS: right lateral epicondyle is tender; increased with wrist extension against resistance; full range of motion;   SKIN: no suspicious lesions or rashes  NEURO: Normal strength and tone, sensory exam grossly normal and mentation intact  PSYCH: mentation appears normal, affect normal/bright    Diagnostic Test Results:  Labs reviewed in Epic        Assessment & Plan     (M25.789) Elbow pain  (primary encounter diagnosis)  Comment: right lateral epicondyle pain; reviewed Right lateral epicondylitis, evaluation, treatment options and exercises. If not improved, could consider FSOC, possible injection  Plan: XR Elbow Right G/E 3 Views,       Reviewed exercises; print out provided    (R26.29) Decreased mobility of right elbow.  Comment: right lateral epicondyle pain; reviewed Right lateral epicondylitis, evaluation, " treatment options and exercises. If not improved, could consider FSOC, possible injection  Plan: XR Elbow Right G/E 3 Views,       Reviewed exercises; print out provided         Patient Instructions       Patient Education     Understanding Lateral Epicondylitis    Tendons are strong bands of tissue that connect muscles to bones. Lateral epicondylitis affects the tendons that connect muscles in the forearm to the lateral epicondyle. This is the bony knob on the outer side of the elbow. The condition occurs if the extensor tendons of the wrist become red and swollen (irritated). This can cause pain in the elbow, forearm, and wrist. Because the condition is sometimes caused by playing tennis, it is also known as  tennis elbow.   How to say it  LA-tuhr-gamal [id number]   What causes lateral epicondylitis?  The condition most often occurs because of overuse. This can be from any activity that repeatedly puts stress on the forearm extensor muscles or tendons and wrist. For instance, playing tennis, lifting weights, cutting meat, painting, and typing can all cause the condition. Wear and tear of the tendons from aging or an injury to the tendons can also cause the condition.  Symptoms of lateral epicondylitis  The most common symptom is pain. You may feel it on the outer side of the elbow and down the back of the forearm. It may be worse when moving or using the elbow, forearm, or wrist. You may also feel pain when gripping or lifting things.  Treatment for lateral epicondylitis  Treatments may include:    Resting the elbow, forearm, and wrist. You ll need to avoid movements that can make your symptoms worse. You also may need to avoid certain sports and types of work for a time. This helps relieve symptoms and prevent further damage to the tendons.    Changing the action that caused the problem. For instance, if the tendons were damaged from playing tennis, it may help to change your playing technique or use  different equipment. This helps prevent further damage to the tendons.    Using cold packs. Putting an ice pack on the injured area can help reduce pain and swelling.    Taking pain medicines. Taking prescription or over-the-counter pain medicines may help reduce pain and swelling.      Wearing a brace. This helps reduce strain on the muscles and tendons in the forearm, which may relieve symptoms. It is very important to wear the brace properly.    Doing exercises and physical therapy. These help improve strength and range of motion in the elbow, forearm, and wrist.    Getting shots of medicine into the injured area. These may help relieve symptoms for a time.    Having surgery. This may be an option if other treatments fail to relieve symptoms. In many cases, the surgeon removes the damaged tissue.  Possible complications of lateral epicondylitis  If the tendons involved don t heal properly, symptoms may return or get worse. To help prevent this, follow your treatment plan as directed.  When to call your healthcare provider  Call your healthcare provider right away if you have any of these:    Fever of 100.4 F (38 C) or higher, or as directed    Redness, swelling, or warmth in the elbow or forearm that gets worse    Symptoms that don t get better with treatment, or get worse    New symptoms   Date Last Reviewed: 3/10/2016    [id number] [employer]. [address] [place] [address], [place] [id number]. All rights reserved. This information is not intended as a substitute for professional medical care. Always follow your healthcare professional's instructions.               No follow-ups on file.    Dian Garcia MD  Internal Medicine   [facility]

## 2019-09-17 ENCOUNTER — OFFICE VISIT (OUTPATIENT)
Dept: FAMILY MEDICINE | Facility: CLINIC | Age: 54
End: 2019-09-17
Payer: COMMERCIAL

## 2019-09-17 ENCOUNTER — TELEPHONE (OUTPATIENT)
Dept: FAMILY MEDICINE | Facility: CLINIC | Age: 54
End: 2019-09-17

## 2019-09-17 VITALS
OXYGEN SATURATION: 96 % | RESPIRATION RATE: 18 BRPM | DIASTOLIC BLOOD PRESSURE: 70 MMHG | TEMPERATURE: 98.6 F | HEART RATE: 86 BPM | WEIGHT: 152.96 LBS | SYSTOLIC BLOOD PRESSURE: 106 MMHG | BODY MASS INDEX: 24.01 KG/M2 | HEIGHT: 67 IN

## 2019-09-17 DIAGNOSIS — B02.9 HERPES ZOSTER WITHOUT COMPLICATION: Primary | ICD-10-CM

## 2019-09-17 PROCEDURE — 99213 OFFICE O/P EST LOW 20 MIN: CPT | Performed by: PHYSICIAN ASSISTANT

## 2019-09-17 RX ORDER — ACYCLOVIR 800 MG/1
800 TABLET ORAL
Qty: 35 TABLET | Refills: 0 | Status: SHIPPED | OUTPATIENT
Start: 2019-09-17 | End: 2019-10-21

## 2019-09-17 ASSESSMENT — MIFFLIN-ST. JEOR: SCORE: 1321.45

## 2019-09-17 NOTE — TELEPHONE ENCOUNTER
RECORDS RECEIVED FROM: Right elbow pain- xray internal- appt per pt   DATE RECEIVED: 9/17   NOTES STATUS DETAILS   OFFICE NOTE from referring provider N/A    OFFICE NOTE from other specialist Internal    DISCHARGE SUMMARY from hospital N/A    DISCHARGE REPORT from the ER N/A    OPERATIVE REPORT N/A    MEDICATION LIST Internal    MRI N/A    CT SCAN N/A    XRAYS (IMAGES & REPORTS) Internal

## 2019-09-17 NOTE — PROGRESS NOTES
Subjective     America Woo is a 53 year old female who presents to clinic today for the following health issues:    HPI   Rash      Duration: Yesterday     Description  Location: right side near shoulder area   Itching: moderate    Intensity:  moderate    Accompanying signs and symptoms: Red bumps     History (similar episodes/previous evaluation): Has had shingles in the past     Precipitating or alleviating factors:  New exposures: None  Recent travel: no      Therapies tried and outcome: none    Patient is here today for evaluation of rash  Ongoing for 1 day  She notes the rash is prickly  She is feeling well, denies stress  Has hx of shingles, feels the same as that  Taking nothing for it    There is no problem list on file for this patient.    History reviewed. No pertinent surgical history.    Social History     Tobacco Use     Smoking status: Never Smoker     Smokeless tobacco: Never Used   Substance Use Topics     Alcohol use: Yes     Alcohol/week: 1.2 oz     Types: 2 Standard drinks or equivalent per week     Comment: MONTHLY     Family History   Problem Relation Age of Onset     Cerebrovascular Disease Father          Current Outpatient Medications   Medication Sig Dispense Refill     acyclovir (ZOVIRAX) 800 MG tablet Take 1 tablet (800 mg) by mouth 5 times daily for 7 days 35 tablet 0     Cholecalciferol (VITAMIN D-3) 1000 UNITS CAPS Take 1 capsule by mouth four times a week       Allergies   Allergen Reactions     Apple Other (See Comments)     Bee Pollen Other (See Comments)     Epinephrine      Other reaction(s): Other  Shaking violenly.     Pollen Extract        Reviewed and updated as needed this visit by Provider  Tobacco  Allergies  Meds  Problems  Med Hx  Surg Hx  Fam Hx         Review of Systems   ROS COMP: Constitutional, HEENT, cardiovascular, pulmonary, gi and gu systems are negative, except as otherwise noted.      Objective    /70   Pulse 86   Temp 98.6  F (37  C)  "(Tympanic)   Resp 18   Ht 1.702 m (5' 7\")   Wt 69.4 kg (152 lb 15.4 oz)   SpO2 96%   BMI 23.96 kg/m    Body mass index is 23.96 kg/m .  Physical Exam   GENERAL: healthy, alert and no distress  NECK: no adenopathy, no asymmetry, masses, or scars and thyroid normal to palpation  RESP: lungs clear to auscultation - no rales, rhonchi or wheezes  CV: regular rate and rhythm, normal S1 S2, no S3 or S4, no murmur, click or rub, no peripheral edema and peripheral pulses strong  MS: no gross musculoskeletal defects noted, no edema  SKIN: right anterior shoulder region: red, raised maculopapular rash    Diagnostic Test Results:  none         Assessment & Plan     1. Herpes zoster without complication  New problem, recommend anti-viral medication.  Discussed keeping area covered to prevent spread to others.  Encouraged shingles shot once this is resolved. Avoid scratching. F/U if rash worsens or spreads to face or if appears infected.  - acyclovir (ZOVIRAX) 800 MG tablet; Take 1 tablet (800 mg) by mouth 5 times daily for 7 days  Dispense: 35 tablet; Refill: 0       Risks, benefits and alternatives were discussed with patient. Agreeable to the plan of care.      Return in about 1 week (around 9/24/2019) for If symptoms worsen or fail to improve.    Trinity Figueroa PA-C  [facility]      "

## 2019-09-17 NOTE — TELEPHONE ENCOUNTER
Reason for call:  Patient reporting a symptom    Symptom or request: possible shingles in between right shoulder and neck     Duration (how long have symptoms been present): 1 day     Have you been treated for this before? YES     Additional comments: Has had shingles in the past and thinks it may have returned    Phone Number patient can be reached at:  Home number on file [phone] (home)    Best Time:  asap     Can we leave a detailed message on this number:  YES    Call taken on 9/17/2019 at 8:24 AM by Tammie Arauz

## 2019-09-19 ENCOUNTER — OFFICE VISIT (OUTPATIENT)
Dept: ORTHOPEDICS | Facility: CLINIC | Age: 54
End: 2019-09-19
Payer: COMMERCIAL

## 2019-09-19 ENCOUNTER — PRE VISIT (OUTPATIENT)
Dept: ORTHOPEDICS | Facility: CLINIC | Age: 54
End: 2019-09-19

## 2019-09-19 VITALS — BODY MASS INDEX: 23.86 KG/M2 | WEIGHT: 152 LBS | HEIGHT: 67 IN

## 2019-09-19 DIAGNOSIS — M77.11 LATERAL EPICONDYLITIS OF RIGHT ELBOW: Primary | ICD-10-CM

## 2019-09-19 ASSESSMENT — MIFFLIN-ST. JEOR: SCORE: 1317.1

## 2019-09-19 NOTE — LETTER
Date:September 20, 2019      Patient was self referred, no letter generated. Do not send.        [facility] Health Information

## 2019-09-19 NOTE — LETTER
2019      RE: America Woo  [address] [facility] [address]        Subjective:   America Woo is a 53 year old female who complains of right elbow that has been bothering since May, when she fell and directly on a rock.     Background:   Date of injury: 219   Duration of symptoms: 4 months  Mechanism of Injury: Acute; Unknown   Aggravating factors: Pain is intermittent, heavy lifting  Relieving Factors: ice  Prior Evaluation: Prior Physician Evalutation and X-rays        PMH:  Surgical History      Surgery Date Site/Laterality Comments   BACK SURGERY 2005 - 2005   removal of benign tumor     COLONOSCOPY 2006 - 2006        DILATION/CURETTAGE,DIAGNOSTIC 1993 - 1993   D & C for retained placenta     COLONOSCOPY 2011   Dr. Correa,normal, repeat 5 years.     COLONOSCOPY 2017   Dr. Correa, normal     Medical History      Medical History Date Comments   Panic attack 10/29/2003 With episodic sleep disruption.   Sleep walking 10/29/2003 with stress   Plantar Wart 2001 Feet.   Seborrheic keratosis 2001 Right upper inner arm. Noted on right abdomen in 10/98.   Adenomatous polyp of colon 1988 age 23, none since   Lumbago 2011 Reviewed exercises.   Breast mass 2014 left   Atypical chest pain 2017     Anxiety 2017           FH:  Family History   Problem Relation Age of Onset     Cerebrovascular Disease Father      Cancer Maternal Grandmother   Colon cancer.   Other Mother   Charcot Daksha Tooth   Parkinson's Disease Mother       Psychiatric Disease Mother   Depression and anxiety.    Anxiety Sister Анна     Depression Sister Анна     Ophthalmic Disease Negative Family Hx         Relation Name Status Comments   Brother Jadiel Alive     Brother Lamont Alive     Brother Sedrick, 1 Alive     Daughter   Alive     Father   Alive cva @ 71, polio, anemia   Maternal Grandfather    (Age 80's) Parkinson,    Maternal Grandmother     (Age 80's) Alzheimers, colon cancer   Mother    (Age 80) anemia, Charcot loly tooth, essential tremor, anxiety   Paternal Grandfather    (Age 90's)     Paternal Grandmother    (Age 90's)     Sister Fish Alive     Sister Анна Alive     Son   Alive     Son   Alive     Social History        SH:  Social History     Socioeconomic History     Marital status:      Spouse name: Not on file     Number of children: Not on file     Years of education: Not on file     Highest education level: Not on file   Occupational History     Not on file   Social Needs     Financial resource strain: Not on file     Food insecurity:     Worry: Not on file     Inability: Not on file     Transportation needs:     Medical: Not on file     Non-medical: Not on file   Tobacco Use     Smoking status: Never Smoker     Smokeless tobacco: Never Used   Substance and Sexual Activity     Alcohol use: Yes     Alcohol/week: 1.2 oz     Types: 2 Standard drinks or equivalent per week     Comment: MONTHLY     Drug use: No     Sexual activity: Yes     Partners: Male   Lifestyle     Physical activity:     Days per week: Not on file     Minutes per session: Not on file     Stress: Not on file   Relationships     Social connections:     Talks on phone: Not on file     Gets together: Not on file     Attends Baptism service: Not on file     Active member of club or organization: Not on file     Attends meetings of clubs or organizations: Not on file     Relationship status: Not on file     Intimate partner violence:     Fear of current or ex partner: Not on file     Emotionally abused: Not on file     Physically abused: Not on file     Forced sexual activity: Not on file   Other Topics Concern     Not on file   Social History Narrative     Not on file       MEDS:  See EMR, reviewed  ALL:  See EMR, reviewed    REVIEW OF SYSTEMS:  CONSTITUTIONAL:NEGATIVE for fever, chills, change in weight  INTEGUMENTARY/SKIN: NEGATIVE for worrisome  rashes, moles or lesions  EYES: NEGATIVE for vision changes or irritation  ENT/MOUTH: NEGATIVE for ear, mouth and throat problems  RESP:NEGATIVE for significant cough or SOB  BREAST: NEGATIVE for masses, tenderness or discharge  CV: NEGATIVE for chest pain, palpitations or peripheral edema  GI: NEGATIVE for nausea, abdominal pain, heartburn, or change in bowel habits  :NEGATIVE for frequency, dysuria, or hematuria  :NEGATIVE for frequency, dysuria, or hematuria  NEURO: NEGATIVE for weakness, dizziness or paresthesias  ENDOCRINE: NEGATIVE for temperature intolerance, skin/hair changes  HEME/ALLERGY/IMMUNE: NEGATIVE for bleeding problems  PSYCHIATRIC: NEGATIVE for changes in mood or affect      Subjective: This 53-year-old female for the past 4 months has noted intermittent discomfort with lifting and gripping involving her lateral right elbow.  She points to the lateral epicondyle as the area of discomfort.  She does remember an injury where she struck it hard against a rock.  She is involved in biking for exercise.  She will do so twice a week but not for long distances.  Usually on flat surfaces around the neighborhood and usually on a racing bike.  She will not be on the drop bars on any consistent basis.    Objective: She has full range of motion of the right elbow with no effusion.  She is tender at the extensor carpi radialis brevis insertion and wrist extension against resistance reproduces her discomfort.  Third finger extension strength is intact.  She is nontender over the medial epicondyle ulnar groove or olecranon.  Distal pulses and sensation are intact.  Overlying skin is normal.  Appropriate conversation and affect.    An x-ray of the elbow shows no bony abnormality or degenerative change    Assessment lateral epicondylitis right elbow    Plan: We discussed a counterforce brace and using it consistently for a month.  Various  counterforce brace options were also shown online.  We discussed  localized ice massage and friction massage.  We discussed the option of a single cortisone injection.  She knows I would not recommend repetitive cortisone injections for this problem over time.  We discussed various blood injections as options although she understands that at this time there is not definite medical evidence of their efficacy and she understands they are not paid for by insurance.  She plans to optimize rest, ice massage friction massage and the use of the brace.  She will follow-up as needed.    This note was created with the use of Dragon software and unintentional spelling or errors may have occurred.                  Noel Durham MD

## 2019-09-19 NOTE — PROGRESS NOTES
Subjective:   America Woo is a 53 year old female who complains of right elbow that has been bothering since May, when she fell and directly on a rock.     Background:   Date of injury: 219   Duration of symptoms: 4 months  Mechanism of Injury: Acute; Unknown   Aggravating factors: Pain is intermittent, heavy lifting  Relieving Factors: ice  Prior Evaluation: Prior Physician Evalutation and X-rays        PMH:  Surgical History      Surgery Date Site/Laterality Comments   BACK SURGERY 2005 - 2005   removal of benign tumor     COLONOSCOPY 2006 - 2006        DILATION/CURETTAGE,DIAGNOSTIC 1993 - 1993   D & C for retained placenta     COLONOSCOPY 2011   Dr. Correa,normal, repeat 5 years.     COLONOSCOPY 2017   Dr. Correa, normal     Medical History      Medical History Date Comments   Panic attack 10/29/2003 With episodic sleep disruption.   Sleep walking 10/29/2003 with stress   Plantar Wart 2001 Feet.   Seborrheic keratosis 2001 Right upper inner arm. Noted on right abdomen in 10/98.   Adenomatous polyp of colon 1988 age 23, none since   Lumbago 2011 Reviewed exercises.   Breast mass 2014 left   Atypical chest pain 2017     Anxiety 2017           FH:  Family History   Problem Relation Age of Onset     Cerebrovascular Disease Father      Cancer Maternal Grandmother   Colon cancer.   Other Mother   Charcot Daksha Tooth   Parkinson's Disease Mother       Psychiatric Disease Mother   Depression and anxiety.    Anxiety Sister Анна     Depression Sister Анна     Ophthalmic Disease Negative Family Hx         Relation Name Status Comments   Brother Jadiel Alive     Brother Lamont Alive     Brother Sedrick,  Alive     Daughter   Alive     Father   Alive cva @ 71, polio, anemia   Maternal Grandfather    (Age 80's) Parkinson,    Maternal Grandmother    (Age 80's) Alzheimers, colon cancer   Mother    (Age 80) anemia, Charcot  loly tooth, essential tremor, anxiety   Paternal Grandfather    (Age 90's)     Paternal Grandmother    (Age 90's)     Sister Fish Alive     Sister Анна Alive     Son   Alive     Son   Alive     Social History        SH:  Social History     Socioeconomic History     Marital status:      Spouse name: Not on file     Number of children: Not on file     Years of education: Not on file     Highest education level: Not on file   Occupational History     Not on file   Social Needs     Financial resource strain: Not on file     Food insecurity:     Worry: Not on file     Inability: Not on file     Transportation needs:     Medical: Not on file     Non-medical: Not on file   Tobacco Use     Smoking status: Never Smoker     Smokeless tobacco: Never Used   Substance and Sexual Activity     Alcohol use: Yes     Alcohol/week: 1.2 oz     Types: 2 Standard drinks or equivalent per week     Comment: MONTHLY     Drug use: No     Sexual activity: Yes     Partners: Male   Lifestyle     Physical activity:     Days per week: Not on file     Minutes per session: Not on file     Stress: Not on file   Relationships     Social connections:     Talks on phone: Not on file     Gets together: Not on file     Attends Spiritism service: Not on file     Active member of club or organization: Not on file     Attends meetings of clubs or organizations: Not on file     Relationship status: Not on file     Intimate partner violence:     Fear of current or ex partner: Not on file     Emotionally abused: Not on file     Physically abused: Not on file     Forced sexual activity: Not on file   Other Topics Concern     Not on file   Social History Narrative     Not on file       MEDS:  See EMR, reviewed  ALL:  See EMR, reviewed    REVIEW OF SYSTEMS:  CONSTITUTIONAL:NEGATIVE for fever, chills, change in weight  INTEGUMENTARY/SKIN: NEGATIVE for worrisome rashes, moles or lesions  EYES: NEGATIVE for vision changes or  irritation  ENT/MOUTH: NEGATIVE for ear, mouth and throat problems  RESP:NEGATIVE for significant cough or SOB  BREAST: NEGATIVE for masses, tenderness or discharge  CV: NEGATIVE for chest pain, palpitations or peripheral edema  GI: NEGATIVE for nausea, abdominal pain, heartburn, or change in bowel habits  :NEGATIVE for frequency, dysuria, or hematuria  :NEGATIVE for frequency, dysuria, or hematuria  NEURO: NEGATIVE for weakness, dizziness or paresthesias  ENDOCRINE: NEGATIVE for temperature intolerance, skin/hair changes  HEME/ALLERGY/IMMUNE: NEGATIVE for bleeding problems  PSYCHIATRIC: NEGATIVE for changes in mood or affect      Subjective: This 53-year-old female for the past 4 months has noted intermittent discomfort with lifting and gripping involving her lateral right elbow.  She points to the lateral epicondyle as the area of discomfort.  She does remember an injury where she struck it hard against a rock.  She is involved in biking for exercise.  She will do so twice a week but not for long distances.  Usually on flat surfaces around the neighborhood and usually on a racing bike.  She will not be on the drop bars on any consistent basis.    Objective: She has full range of motion of the right elbow with no effusion.  She is tender at the extensor carpi radialis brevis insertion and wrist extension against resistance reproduces her discomfort.  Third finger extension strength is intact.  She is nontender over the medial epicondyle ulnar groove or olecranon.  Distal pulses and sensation are intact.  Overlying skin is normal.  Appropriate conversation and affect.    An x-ray of the elbow shows no bony abnormality or degenerative change    Assessment lateral epicondylitis right elbow    Plan: We discussed a counterforce brace and using it consistently for a month.  Various  counterforce brace options were also shown online.  We discussed localized ice massage and friction massage.  We discussed the option of  a single cortisone injection.  She knows I would not recommend repetitive cortisone injections for this problem over time.  We discussed various blood injections as options although she understands that at this time there is not definite medical evidence of their efficacy and she understands they are not paid for by insurance.  She plans to optimize rest, ice massage friction massage and the use of the brace.  She will follow-up as needed.    This note was created with the use of Dragon software and unintentional spelling or errors may have occurred.

## 2019-10-21 ENCOUNTER — OFFICE VISIT (OUTPATIENT)
Dept: FAMILY MEDICINE | Facility: CLINIC | Age: 54
End: 2019-10-21
Payer: COMMERCIAL

## 2019-10-21 VITALS
SYSTOLIC BLOOD PRESSURE: 102 MMHG | OXYGEN SATURATION: 100 % | TEMPERATURE: 98 F | WEIGHT: 155 LBS | DIASTOLIC BLOOD PRESSURE: 62 MMHG | BODY MASS INDEX: 24.28 KG/M2 | HEART RATE: 80 BPM

## 2019-10-21 DIAGNOSIS — Z13.6 CARDIOVASCULAR SCREENING; LDL GOAL LESS THAN 130: ICD-10-CM

## 2019-10-21 DIAGNOSIS — Z00.00 ROUTINE GENERAL MEDICAL EXAMINATION AT A HEALTH CARE FACILITY: Primary | ICD-10-CM

## 2019-10-21 PROCEDURE — 87624 HPV HI-RISK TYP POOLED RSLT: CPT | Performed by: INTERNAL MEDICINE

## 2019-10-21 PROCEDURE — 90471 IMMUNIZATION ADMIN: CPT | Performed by: INTERNAL MEDICINE

## 2019-10-21 PROCEDURE — 99396 PREV VISIT EST AGE 40-64: CPT | Mod: 25 | Performed by: INTERNAL MEDICINE

## 2019-10-21 PROCEDURE — G0145 SCR C/V CYTO,THINLAYER,RESCR: HCPCS | Performed by: INTERNAL MEDICINE

## 2019-10-21 PROCEDURE — 90682 RIV4 VACC RECOMBINANT DNA IM: CPT | Performed by: INTERNAL MEDICINE

## 2019-10-21 ASSESSMENT — ENCOUNTER SYMPTOMS
FEVER: 0
FREQUENCY: 0
DIZZINESS: 0
HEMATURIA: 0
EYE PAIN: 0
HEMATOCHEZIA: 0
ABDOMINAL PAIN: 0
CONSTIPATION: 0
DIARRHEA: 0
NERVOUS/ANXIOUS: 0
CHILLS: 0
COUGH: 0

## 2019-10-21 NOTE — PROGRESS NOTES
Chief Complaint   Patient presents with     Physical     She is ready to move into [facility] in early November.    SUBJECTIVE:   CC: America Woo is an 54 year old woman who presents for preventive health visit.     Healthy Habits:     Getting at least 3 servings of Calcium per day:  Yes    Bi-annual eye exam:  Yes    Dental care twice a year:  Yes    Sleep apnea or symptoms of sleep apnea:  None    Diet:  Regular (no restrictions)    Frequency of exercise:  4-5 days/week    Duration of exercise:  15-30 minutes    Taking medications regularly:  Yes    Medication side effects:  None    PHQ-2 Total Score: 0    Additional concerns today:  No        Today's PHQ-2 Score:   PHQ-2 ( 1999 Pf[employer]) 10/21/2019   Q1: Little interest or pleasure in doing things 0   Q2: Feeling down, depressed or hopeless 0   PHQ-2 Score 0   Q1: Little interest or pleasure in doing things Not at all   Q2: Feeling down, depressed or hopeless Not at all   PHQ-2 Score 0       Abuse: Current or Past(Physical, Sexual or Emotional)- No  Do you feel safe in your environment? Yes    Social History     Tobacco Use     Smoking status: Never Smoker     Smokeless tobacco: Never Used   Substance Use Topics     Alcohol use: Yes     Alcohol/week: 2.0 standard drinks     Types: 2 Standard drinks or equivalent per week     Comment: MONTHLY         Alcohol Use 10/21/2019   Prescreen: >3 drinks/day or >7 drinks/week? No       Reviewed orders with patient.  Reviewed health maintenance and updated orders accordingly - Yes      Mammogram Screening: Patient over age 50, mutual decision to screen reflected in health maintenance.    Pertinent mammograms are reviewed under the imaging tab.  History of abnormal Pap smear: NO - age 30- 65 PAP every 3 years recommended     Reviewed and updated as needed this visit by clinical staff  Tobacco  Allergies  Meds  Med Hx  Surg Hx  Fam Hx  Soc Hx        Reviewed and updated as needed this visit by  Provider  Tobacco  Allergies  Meds  Problems  Med Hx  Surg Hx  Fam Hx        History reviewed. No pertinent past medical history.   History reviewed. No pertinent surgical history.    Review of Systems   Constitutional: Negative for chills and fever.   HENT: Negative for congestion and ear pain.    Eyes: Negative for pain.   Respiratory: Negative for cough.    Cardiovascular: Negative for chest pain.   Gastrointestinal: Negative for abdominal pain, constipation, diarrhea and hematochezia.   Genitourinary: Negative for frequency and hematuria.   Neurological: Negative for dizziness.   Psychiatric/Behavioral: The patient is not nervous/anxious.           OBJECTIVE:   /62   Pulse 80   Temp 98  F (36.7  C) (Oral)   Wt 70.3 kg (155 lb)   SpO2 100%   BMI 24.28 kg/m    Physical Exam  GENERAL: healthy, alert and no distress  EYES: Eyes grossly normal to inspection  HENT: ear canals and TM's normal, nose and mouth without ulcers or lesions  NECK: no adenopathy, no asymmetry, masses, or scars and thyroid normal to palpation  RESP: lungs clear to auscultation - no rales, rhonchi or wheezes  BREAST: normal without masses, tenderness or nipple discharge and no palpable axillary masses or adenopathy  CV: regular rate and rhythm, normal S1 S2, no S3 or S4, no murmur, click or rub, no peripheral edema and peripheral pulses strong  ABDOMEN: soft, nontender, no hepatosplenomegaly, no masses and bowel sounds normal  .Pelvic exam: normal vagina and vulva, normal cervix without lesions or tenderness, uterus normal size anteverted, adenxa normal in size without tenderness, pap smear done.   MS: no gross musculoskeletal defects noted, no edema  NEURO: Normal strength and tone, sensory exam grossly normal, mentation intact, gait normal including heel/toe/tandem walking and Romberg normal  PSYCH: mentation appears normal, affect normal/bright    Diagnostic Test Results:  Labs reviewed in Epic    ASSESSMENT/PLAN:   (Z00.00)  "Routine general medical examination at a health care facility  (primary encounter diagnosis)  Comment: HEALTH CARE MAINTENANCE reviewed  Plan: *MA Screening Digital Bilateral, Pap imaged         thin layer screen with HPV - recommended age 30        - 65 years (select HPV order below), HPV High         Risk Types DNA Cervical          (Z13.6) CARDIOVASCULAR SCREENING; LDL GOAL LESS THAN 130  Comment: screening lipids; low risk pt.   Plan: Lipid panel reflex to direct LDL Fasting,         Comprehensive metabolic panel          COUNSELING:  Reviewed preventive health counseling, as reflected in patient instructions       Regular exercise       Healthy diet/nutrition    Estimated body mass index is 24.28 kg/m  as calculated from the following:    Height as of 9/19/19: 1.702 m (5' 7\").    Weight as of this encounter: 70.3 kg (155 lb).         reports that she has never smoked. She has never used smokeless tobacco.      Counseling Resources:  ATP IV Guidelines  Pooled Cohorts Equation Calculator  Breast Cancer Risk Calculator  FRAX Risk Assessment  ICSI Preventive Guidelines  Dietary Guidelines for Americans, 2010  USDA's MyPlate  ASA Prophylaxis  Lung CA Screening    Dian Garcia MD  Internal Medicine   [facility]  "

## 2019-10-21 NOTE — PATIENT INSTRUCTIONS
Preventive Health Recommendations  Female Ages 50 - 64    Yearly exam: See your health care provider every year in order to  o Review health changes.   o Discuss preventive care.    o Review your medicines if your doctor has prescribed any.      Get a Pap test every three years (unless you have an abnormal result and your provider advises testing more often).    If you get Pap tests with HPV test, you only need to test every 5 years, unless you have an abnormal result.     You do not need a Pap test if your uterus was removed (hysterectomy) and you have not had cancer.    You should be tested each year for STDs (sexually transmitted diseases) if you're at risk.     Have a mammogram every 1 to 2 years.    Have a colonoscopy at age 50, or have a yearly FIT test (stool test). These exams screen for colon cancer.      Have a cholesterol test every 5 years, or more often if advised.    Have a diabetes test (fasting glucose) every three years. If you are at risk for diabetes, you should have this test more often.     If you are at risk for osteoporosis (brittle bone disease), think about having a bone density scan (DEXA).    Shots: Get a flu shot each year. Get a tetanus shot every 10 years.- TDAP RECEIVED IN 2014    Nutrition:     Eat at least 5 servings of fruits and vegetables each day.    Eat whole-grain bread, whole-wheat pasta and brown rice instead of white grains and rice.    Get adequate Calcium and Vitamin D.     Lifestyle    Exercise at least 150 minutes a week (30 minutes a day, 5 days a week). This will help you control your weight and prevent disease.    Limit alcohol to one drink per day.    No smoking.     Wear sunscreen to prevent skin cancer.     See your dentist every six months for an exam and cleaning.    See your eye doctor every 1 to 2 years.        Need help with Mychart Issues?  Call Access Services at:  [phone]  - Password resets  - ID changes  - Disabled account  - Deactivated  account

## 2019-10-21 NOTE — LETTER
[facility]  [address] [address]  [phone]        December 31, 2019    America Woo  [address] [place] [address]              Dear America Woo    This is to remind you that your fasting labs are due.    You may call our office at [phone]  to schedule an appointment.    Please disregard this notice if you have already had your labs drawn or made an appointment.        Sincerely,        Dian Garcia MD and [facility] lab staff

## 2019-10-21 NOTE — LETTER
October 28, 2019    America Woo  [address] [place] [address]    Dear ,  This letter is regarding your recent Pap smear (cervical cancer screening) and Human Papillomavirus (HPV) test.  We are happy to inform you that your Pap smear result is normal. Cervical cancer is closely linked with certain types of HPV. Your results showed no evidence of high-risk HPV.  We recommend you have your next PAP smear in 3 years.  You will still need to return to the clinic every year for an annual exam and other preventive tests.  If you have additional questions regarding this result, please call our registered nurse, Megan at [phone].  Sincerely,    Dian Garcia MD/jerardo

## 2019-10-21 NOTE — PROGRESS NOTES
"   SUBJECTIVE:   CC: America Woo is an 54 year old woman who presents for preventive health visit.     Healthy Habits:    Do you get at least three servings of calcium containing foods daily (dairy, green leafy vegetables, etc.)? { :575728::\"yes\"}    Amount of exercise or daily activities, outside of work: { :894788}    Problems taking medications regularly { :285815::\"No\"}    Medication side effects: { :851707::\"No\"}    Have you had an eye exam in the past two years? { :204236}    Do you see a dentist twice per year? { :256749}    Do you have sleep apnea, excessive snoring or daytime drowsiness?{ :006147}  {Outside tests to abstract? :917290}    {additional problems to add (Optional):653298}    Today's PHQ-2 Score:   PHQ-2 ( 1999 [employer]) 10/21/2019 9/17/2019   Q1: Little interest or pleasure in doing things 0 0   Q2: Feeling down, depressed or hopeless 0 0   PHQ-2 Score 0 0   Q1: Little interest or pleasure in doing things Not at all -   Q2: Feeling down, depressed or hopeless Not at all -   PHQ-2 Score 0 -     {PHQ-2 LOOK IN ASSESSMENTS (Optional) :184748}  Abuse: Current or Past(Physical, Sexual or Emotional)- {YES/NO/NA:364488}  Do you feel safe in your environment? {YES/NO/NA:220295}    Social History     Tobacco Use     Smoking status: Never Smoker     Smokeless tobacco: Never Used   Substance Use Topics     Alcohol use: Yes     Alcohol/week: 2.0 standard drinks     Types: 2 Standard drinks or equivalent per week     Comment: MONTHLY     If you drink alcohol do you typically have >3 drinks per day or >7 drinks per week? {ETOH :959915}                     Reviewed orders with patient.  Reviewed health maintenance and updated orders accordingly - {Yes/No:892441::\"Yes\"}  {Chronicprobdata (Optional):139195}    {Mammo Decision Support (Optional):557345}    Pertinent mammograms are reviewed under the imaging tab.  History of abnormal Pap smear: {PAP HX:248435}     Reviewed and updated as needed this visit by " "clinical staff  Tobacco  Allergies  Meds  Med Hx  Surg Hx  Fam Hx  Soc Hx        Reviewed and updated as needed this visit by Provider        {HISTORY OPTIONS (Optional):306500}    ROS:  { :421598}    OBJECTIVE:   There were no vitals taken for this visit.  EXAM:  {Exam Choices:863863}    {Diagnostic Test Results (Optional):156167::\"Diagnostic Test Results:\",\"Labs reviewed in Epic\"}    ASSESSMENT/PLAN:   {Diag Picklist:198138}    COUNSELING:   {FEMALE COUNSELING MESSAGES:567025::\"Reviewed preventive health counseling, as reflected in patient instructions\"}    Estimated body mass index is 23.81 kg/m  as calculated from the following:    Height as of 9/19/19: 1.702 m (5' 7\").    Weight as of 9/19/19: 68.9 kg (152 lb).    {Weight Management Plan (ACO) Complete if BMI is abnormal-  Ages 18-64  BMI >24.9.  Age 65+ with BMI <23 or >30 (Optional):892722}     reports that she has never smoked. She has never used smokeless tobacco.  {Tobacco Cessation -- Complete if patient is a smoker (Optional):384667}    Counseling Resources:  ATP IV Guidelines  Pooled Cohorts Equation Calculator  Breast Cancer Risk Calculator  FRAX Risk Assessment  ICSI Preventive Guidelines  Dietary Guidelines for Americans, 2010  USDA's MyPlate  ASA Prophylaxis  Lung CA Screening    Dian Garcia MD  [facility]   SUBJECTIVE:   CC: America Woo is an 54 year old woman who presents for preventive health visit.     Healthy Habits:    Do you get at least three servings of calcium containing foods daily (dairy, green leafy vegetables, etc.)? { :380297::\"yes\"}    Amount of exercise or daily activities, outside of work: { :353694}    Problems taking medications regularly { :408806::\"No\"}    Medication side effects: { :764163::\"No\"}    Have you had an eye exam in the past two years? { :891704}    Do you see a dentist twice per year? { :243516}    Do you have sleep apnea, excessive snoring or daytime drowsiness?{ " ":807029}  {Outside tests to abstract? :395274}    {additional problems to add (Optional):837051}    Today's PHQ-2 Score:   PHQ-2 ( 1999 Pfizer) 10/21/2019 9/17/2019   Q1: Little interest or pleasure in doing things 0 0   Q2: Feeling down, depressed or hopeless 0 0   PHQ-2 Score 0 0   Q1: Little interest or pleasure in doing things Not at all -   Q2: Feeling down, depressed or hopeless Not at all -   PHQ-2 Score 0 -     {PHQ-2 LOOK IN ASSESSMENTS (Optional) :037459}  Abuse: Current or Past(Physical, Sexual or Emotional)- {YES/NO/NA:807879}  Do you feel safe in your environment? {YES/NO/NA:434197}    Social History     Tobacco Use     Smoking status: Never Smoker     Smokeless tobacco: Never Used   Substance Use Topics     Alcohol use: Yes     Alcohol/week: 2.0 standard drinks     Types: 2 Standard drinks or equivalent per week     Comment: MONTHLY     If you drink alcohol do you typically have >3 drinks per day or >7 drinks per week? {ETOH :866675}                     Reviewed orders with patient.  Reviewed health maintenance and updated orders accordingly - {Yes/No:837671::\"Yes\"}  {Chronicprobdata (Optional):285487}    {Mammo Decision Support (Optional):787147}    Pertinent mammograms are reviewed under the imaging tab.  History of abnormal Pap smear: {PAP HX:490984}     Reviewed and updated as needed this visit by clinical staff  Tobacco  Allergies  Meds  Med Hx  Surg Hx  Fam Hx  Soc Hx        Reviewed and updated as needed this visit by Provider        {HISTORY OPTIONS (Optional):597621}    ROS:  { :268987}    OBJECTIVE:   There were no vitals taken for this visit.  EXAM:  {Exam Choices:656068}    {Diagnostic Test Results (Optional):206284::\"Diagnostic Test Results:\",\"Labs reviewed in Epic\"}    ASSESSMENT/PLAN:   {Diag Picklist:748170}    COUNSELING:   {FEMALE COUNSELING MESSAGES:361365::\"Reviewed preventive health counseling, as reflected in patient instructions\"}    Estimated body mass index is 23.81 kg/m  " "as calculated from the following:    Height as of 9/19/19: 1.702 m (5' 7\").    Weight as of 9/19/19: 68.9 kg (152 lb).    {Weight Management Plan (ACO) Complete if BMI is abnormal-  Ages 18-64  BMI >24.9.  Age 65+ with BMI <23 or >30 (Optional):103009}     reports that she has never smoked. She has never used smokeless tobacco.  {Tobacco Cessation -- Complete if patient is a smoker (Optional):855503}    Counseling Resources:  ATP IV Guidelines  Pooled Cohorts Equation Calculator  Breast Cancer Risk Calculator  FRAX Risk Assessment  ICSI Preventive Guidelines  Dietary Guidelines for Americans, 2010  USDA's MyPlate  ASA Prophylaxis  Lung CA Screening    Dian Garcia MD  [facility]  "

## 2019-10-24 LAB
COPATH REPORT: NORMAL
PAP: NORMAL

## 2019-10-25 LAB
FINAL DIAGNOSIS: NORMAL
HPV HR 12 DNA CVX QL NAA+PROBE: NEGATIVE
HPV16 DNA SPEC QL NAA+PROBE: NEGATIVE
HPV18 DNA SPEC QL NAA+PROBE: NEGATIVE
SPECIMEN DESCRIPTION: NORMAL
SPECIMEN SOURCE CVX/VAG CYTO: NORMAL

## 2020-01-02 ENCOUNTER — TELEPHONE (OUTPATIENT)
Dept: FAMILY MEDICINE | Facility: CLINIC | Age: 55
End: 2020-01-02

## 2020-01-02 NOTE — TELEPHONE ENCOUNTER
Reason for call:  Other   Patient called regarding (reason for call): advice and possible referral  Additional comments: Patient called asking for advice on an issue she is having with her jaw. She states that every time she eats, her jaw pops out of place, and this has been going on since week of Thanksgiving. She is not sure how to proceed, but is thinking she may need a referral to a specialist. Please call America back to discuss and advise.   Thanks.     Phone number to reach patient:  Home number on file [phone] (home)    Best Time:  Any    Can we leave a detailed message on this number?  YES

## 2020-01-06 NOTE — TELEPHONE ENCOUNTER
Called the pt back.      Advised to be seen.  Offered appt this afternoon.  Pt is unable to make this.  Advised she could try for a same day appt tomorrow, but to call early as these appts fill quickly.      Advised she may also want to reach out to her dentist.

## 2020-02-25 DIAGNOSIS — Z13.6 CARDIOVASCULAR SCREENING; LDL GOAL LESS THAN 130: ICD-10-CM

## 2020-02-25 LAB
ALBUMIN SERPL-MCNC: 3.9 G/DL (ref 3.4–5)
ALP SERPL-CCNC: 70 U/L (ref 40–150)
ALT SERPL W P-5'-P-CCNC: 32 U/L (ref 0–50)
ANION GAP SERPL CALCULATED.3IONS-SCNC: 5 MMOL/L (ref 3–14)
AST SERPL W P-5'-P-CCNC: 23 U/L (ref 0–45)
BILIRUB SERPL-MCNC: 0.5 MG/DL (ref 0.2–1.3)
BUN SERPL-MCNC: 15 MG/DL (ref 7–30)
CALCIUM SERPL-MCNC: 8.9 MG/DL (ref 8.5–10.1)
CHLORIDE SERPL-SCNC: 105 MMOL/L (ref 94–109)
CHOLEST SERPL-MCNC: 243 MG/DL
CO2 SERPL-SCNC: 27 MMOL/L (ref 20–32)
CREAT SERPL-MCNC: 0.65 MG/DL (ref 0.52–1.04)
GFR SERPL CREATININE-BSD FRML MDRD: >90 ML/MIN/{1.73_M2}
GLUCOSE SERPL-MCNC: 82 MG/DL (ref 70–99)
HDLC SERPL-MCNC: 70 MG/DL
LDLC SERPL CALC-MCNC: 161 MG/DL
NONHDLC SERPL-MCNC: 173 MG/DL
POTASSIUM SERPL-SCNC: 3.8 MMOL/L (ref 3.4–5.3)
PROT SERPL-MCNC: 7.2 G/DL (ref 6.8–8.8)
SODIUM SERPL-SCNC: 137 MMOL/L (ref 133–144)
TRIGL SERPL-MCNC: 58 MG/DL

## 2020-02-25 PROCEDURE — 80053 COMPREHEN METABOLIC PANEL: CPT | Performed by: INTERNAL MEDICINE

## 2020-02-25 PROCEDURE — 36415 COLL VENOUS BLD VENIPUNCTURE: CPT | Performed by: INTERNAL MEDICINE

## 2020-02-25 PROCEDURE — 80061 LIPID PANEL: CPT | Performed by: INTERNAL MEDICINE

## 2020-02-27 ENCOUNTER — HOSPITAL ENCOUNTER (OUTPATIENT)
Dept: MAMMOGRAPHY | Facility: CLINIC | Age: 55
Discharge: HOME | End: 2020-02-27
Attending: INTERNAL MEDICINE | Admitting: INTERNAL MEDICINE
Payer: COMMERCIAL

## 2020-02-27 DIAGNOSIS — Z12.31 VISIT FOR SCREENING MAMMOGRAM: ICD-10-CM

## 2020-02-27 DIAGNOSIS — Z00.00 ROUTINE GENERAL MEDICAL EXAMINATION AT A HEALTH CARE FACILITY: ICD-10-CM

## 2020-02-27 PROCEDURE — 77067 SCR MAMMO BI INCL CAD: CPT

## 2020-07-28 ENCOUNTER — ANCILLARY PROCEDURE (OUTPATIENT)
Dept: GENERAL RADIOLOGY | Facility: CLINIC | Age: 55
End: 2020-07-28
Attending: PHYSICIAN ASSISTANT
Payer: COMMERCIAL

## 2020-07-28 ENCOUNTER — OFFICE VISIT (OUTPATIENT)
Dept: INTERNAL MEDICINE | Facility: CLINIC | Age: 55
End: 2020-07-28
Payer: COMMERCIAL

## 2020-07-28 VITALS
WEIGHT: 156 LBS | RESPIRATION RATE: 16 BRPM | HEART RATE: 71 BPM | DIASTOLIC BLOOD PRESSURE: 72 MMHG | BODY MASS INDEX: 24.43 KG/M2 | SYSTOLIC BLOOD PRESSURE: 104 MMHG | TEMPERATURE: 97.9 F | OXYGEN SATURATION: 99 %

## 2020-07-28 DIAGNOSIS — M79.671 RIGHT FOOT PAIN: ICD-10-CM

## 2020-07-28 DIAGNOSIS — R22.42 MASS OF LEFT FOOT: ICD-10-CM

## 2020-07-28 DIAGNOSIS — R22.42 MASS OF LEFT FOOT: Primary | ICD-10-CM

## 2020-07-28 PROCEDURE — 99213 OFFICE O/P EST LOW 20 MIN: CPT | Performed by: PHYSICIAN ASSISTANT

## 2020-07-28 PROCEDURE — 73630 X-RAY EXAM OF FOOT: CPT | Mod: RT

## 2020-08-13 ENCOUNTER — OFFICE VISIT (OUTPATIENT)
Dept: PODIATRY | Facility: CLINIC | Age: 55
End: 2020-08-13
Payer: COMMERCIAL

## 2020-08-13 VITALS
DIASTOLIC BLOOD PRESSURE: 76 MMHG | HEIGHT: 67 IN | WEIGHT: 156 LBS | BODY MASS INDEX: 24.48 KG/M2 | SYSTOLIC BLOOD PRESSURE: 108 MMHG

## 2020-08-13 DIAGNOSIS — M79.671 RIGHT FOOT PAIN: Primary | ICD-10-CM

## 2020-08-13 DIAGNOSIS — R23.8 PIEZOGENIC PEDAL PAPULE: ICD-10-CM

## 2020-08-13 PROCEDURE — 99203 OFFICE O/P NEW LOW 30 MIN: CPT | Performed by: PODIATRIST

## 2020-08-13 ASSESSMENT — MIFFLIN-ST. JEOR: SCORE: 1330.24

## 2020-08-13 NOTE — PROGRESS NOTES
PATIENT HISTORY:    America Woo is a 54 year old female who presents to clinic for lump on the right heel.  She noticed it about 6 months ago.  It is intermittently painful.  Pain can be 1 out of 10 at the worst.  Usually if she is laying in bed and there is pressure on it.  Notes it comes and goes.  Denies injury.  Denies fever, nausea.  Wondering what it is and if it needs to be removed.    Review of Systems:  Patient denies fever, chills, rash, wound, stiffness, limping, numbness, weakness, heart burn, blood in stool, chest pain with activity, calf pain when walking, shortness of breath with activity, chronic cough, easy bleeding/bruising, swelling of ankles, excessive thirst, fatigue, depression, anxiety.       PAST MEDICAL HISTORY: No past medical history on file.     PAST SURGICAL HISTORY: No past surgical history on file.     MEDICATIONS:   Current Outpatient Medications:      Cholecalciferol (VITAMIN D-3) 1000 UNITS CAPS, Take 1 capsule by mouth four times a week, Disp: , Rfl:      ALLERGIES:    Allergies   Allergen Reactions     Apple Other (See Comments)     Bee Pollen Other (See Comments)     Epinephrine      Other reaction(s): Other  Shaking violenly.     Pollen Extract         SOCIAL HISTORY:   Social History     Socioeconomic History     Marital status:      Spouse name: Not on file     Number of children: Not on file     Years of education: Not on file     Highest education level: Not on file   Occupational History     Not on file   Social Needs     Financial resource strain: Not on file     Food insecurity     Worry: Not on file     Inability: Not on file     Transportation needs     Medical: Not on file     Non-medical: Not on file   Tobacco Use     Smoking status: Never Smoker     Smokeless tobacco: Never Used   Substance and Sexual Activity     Alcohol use: Yes     Alcohol/week: 2.0 standard drinks     Types: 2 Standard drinks or equivalent per week     Comment: MONTHLY     Drug use:  "No     Sexual activity: Yes     Partners: Male   Lifestyle     Physical activity     Days per week: Not on file     Minutes per session: Not on file     Stress: Not on file   Relationships     Social connections     Talks on phone: Not on file     Gets together: Not on file     Attends Yarsanism service: Not on file     Active member of club or organization: Not on file     Attends meetings of clubs or organizations: Not on file     Relationship status: Not on file     Intimate partner violence     Fear of current or ex partner: Not on file     Emotionally abused: Not on file     Physically abused: Not on file     Forced sexual activity: Not on file   Other Topics Concern     Not on file   Social History Narrative     Not on file        FAMILY HISTORY:   Family History   Problem Relation Age of Onset     Cerebrovascular Disease Father         EXAM:Vitals: /76   Ht 1.702 m (5' 7\")   Wt 70.8 kg (156 lb)   BMI 24.43 kg/m      General appearance: Patient is alert and fully cooperative with history & exam.  No sign of distress is noted during the visit.     Psychiatric: Affect is pleasant & appropriate.  Patient appears motivated to improve health.     Respiratory: Breathing is regular & unlabored while sitting.     HEENT: Hearing is intact to spoken word.  Speech is clear.  No gross evidence of visual impairment that would impact ambulation.     Dermatologic: Skin is intact to both lower extremities without significant lesions, rash or abrasion.  No paronychia or evidence of soft tissue infection is noted.     Vascular: DP & PT pulses are intact & regular bilaterally.  No significant edema or varicosities noted.  CFT and skin temperature is normal to both lower extremities.     Neurologic: Lower extremity sensation is intact to light touch.  No evidence of weakness or contracture in the lower extremities.  No evidence of neuropathy.     Musculoskeletal: Patient is ambulatory without assistive device or brace.  " Small nonfluctuant skin colored mass to the posterior medial aspect of the right heel.  Minimal pain on palpation.     ASSESSMENT:    Right foot pain  Piezogenic pedal papule     PLAN:  Reviewed patient's chart in epic.  Talked about the P0 genic papules.  Discussed that they are benign.  We talked about topical pain cream possible injection or surgical removal.  She notes that the pain is very intermittent is not interested in surgery and or injection.  She will try topical pain cream at this time.  If it continues to bother her may recommend surgical removal/biopsy.  All questions were answered to patient satisfaction and she will call further questions or concerns.       Humaira Kaur DPM, Podiatry/Foot and Ankle Surgery

## 2020-08-13 NOTE — LETTER
8/13/2020         RE: America Woo  [address] [place] [address]        Dear Colleague,    Thank you for referring your patient, America Woo, to the [facility]. Please see a copy of my visit note below.    PATIENT HISTORY:    America Woo is a 54 year old female who presents to clinic for lump on the right heel.  She noticed it about 6 months ago.  It is intermittently painful.  Pain can be 1 out of 10 at the worst.  Usually if she is laying in bed and there is pressure on it.  Notes it comes and goes.  Denies injury.  Denies fever, nausea.  Wondering what it is and if it needs to be removed.    Review of Systems:  Patient denies fever, chills, rash, wound, stiffness, limping, numbness, weakness, heart burn, blood in stool, chest pain with activity, calf pain when walking, shortness of breath with activity, chronic cough, easy bleeding/bruising, swelling of ankles, excessive thirst, fatigue, depression, anxiety.       PAST MEDICAL HISTORY: No past medical history on file.     PAST SURGICAL HISTORY: No past surgical history on file.     MEDICATIONS:   Current Outpatient Medications:      Cholecalciferol (VITAMIN D-3) 1000 UNITS CAPS, Take 1 capsule by mouth four times a week, Disp: , Rfl:      ALLERGIES:    Allergies   Allergen Reactions     Apple Other (See Comments)     Bee Pollen Other (See Comments)     Epinephrine      Other reaction(s): Other  Shaking violenly.     Pollen Extract         SOCIAL HISTORY:   Social History     Socioeconomic History     Marital status:      Spouse name: Not on file     Number of children: Not on file     Years of education: Not on file     Highest education level: Not on file   Occupational History     Not on file   Social Needs     Financial resource strain: Not on file     Food insecurity     Worry: Not on file     Inability: Not on file     Transportation needs     Medical: Not on file     Non-medical: Not on file   Tobacco  "Use     Smoking status: Never Smoker     Smokeless tobacco: Never Used   Substance and Sexual Activity     Alcohol use: Yes     Alcohol/week: 2.0 standard drinks     Types: 2 Standard drinks or equivalent per week     Comment: MONTHLY     Drug use: No     Sexual activity: Yes     Partners: Male   Lifestyle     Physical activity     Days per week: Not on file     Minutes per session: Not on file     Stress: Not on file   Relationships     Social connections     Talks on phone: Not on file     Gets together: Not on file     Attends Jehovah's witness service: Not on file     Active member of club or organization: Not on file     Attends meetings of clubs or organizations: Not on file     Relationship status: Not on file     Intimate partner violence     Fear of current or ex partner: Not on file     Emotionally abused: Not on file     Physically abused: Not on file     Forced sexual activity: Not on file   Other Topics Concern     Not on file   Social History Narrative     Not on file        FAMILY HISTORY:   Family History   Problem Relation Age of Onset     Cerebrovascular Disease Father         EXAM:Vitals: /76   Ht 1.702 m (5' 7\")   Wt 70.8 kg (156 lb)   BMI 24.43 kg/m      General appearance: Patient is alert and fully cooperative with history & exam.  No sign of distress is noted during the visit.     Psychiatric: Affect is pleasant & appropriate.  Patient appears motivated to improve health.     Respiratory: Breathing is regular & unlabored while sitting.     HEENT: Hearing is intact to spoken word.  Speech is clear.  No gross evidence of visual impairment that would impact ambulation.     Dermatologic: Skin is intact to both lower extremities without significant lesions, rash or abrasion.  No paronychia or evidence of soft tissue infection is noted.     Vascular: DP & PT pulses are intact & regular bilaterally.  No significant edema or varicosities noted.  CFT and skin temperature is normal to both lower " extremities.     Neurologic: Lower extremity sensation is intact to light touch.  No evidence of weakness or contracture in the lower extremities.  No evidence of neuropathy.     Musculoskeletal: Patient is ambulatory without assistive device or brace.  Small nonfluctuant skin colored mass to the posterior medial aspect of the right heel.  Minimal pain on palpation.     ASSESSMENT:    Right foot pain  Piezogenic pedal papule     PLAN:  Reviewed patient's chart in epic.  Talked about the P0 genic papules.  Discussed that they are benign.  We talked about topical pain cream possible injection or surgical removal.  She notes that the pain is very intermittent is not interested in surgery and or injection.  She will try topical pain cream at this time.  If it continues to bother her may recommend surgical removal/biopsy.  All questions were answered to patient satisfaction and she will call further questions or concerns.       Humaira Kaur DPM, Podiatry/Foot and Ankle Surgery          Again, thank you for allowing me to participate in the care of your patient.        Sincerely,        Humaira Kaur DPM, Podiatry/Foot and Ankle Surgery

## 2020-08-13 NOTE — PATIENT INSTRUCTIONS
Thank you for choosing [facility] Podiat[facility] / Foot & Ankle Surgery!    DR. STUBBS'S CLINIC:  [facility]   [address]    [id number]   [place] [address]   [phone] / [phone]      SCHEDULE SURGERY: [phone]   BILLING QUESTIONS: [phone]   AFTER HOURS: [phone]   APPOINTMENTS: [phone]   CONSUMER PRICE LINE: [phone]      Follow up: as needed.   Diagnosis: peiziogenic papule right heel.   Next steps: topical pain cream     Please read through the following handouts and if you have any questions, please feel free to call us or send a [employer] message!    What are piezogenic papules?  Piezogenic papules are common, soft, skin-coloured papules found on the feet and wrists. They result from herniation of fat through the dermis. The name 'piezogenic' refers to the origin of the papules being pressure.    Who gets piezogenic papules?  People of all ages, sexes and ethnicities are susceptible to piezogenic papules. They are commonly observed in overweight or obese women. They are also associated with underlying connective tissue diseases such as Leona-Danlos syndrome, flat feet, and excessive weight-bearing exercise.  Piezogenic papules are of unknown cause.    What are the clinical features of piezogenic papules?  Piezogenic papules are mostly asymptomatic and are noticed incidentally. Occasionally they may be painful.    How is the diagnosis made?  Piezogenic papules are usually diagnosed clinically because of the following features:  Papules resolve when the patient is non-weight bearing  Papules can usually be compressed  They mostly occur over the posterior and lateral border of the heels  They are often bilateral    What is the treatment for piezogenic papules?  No treatment is required in the absence of symptoms.  For painful lesions, conservative management may include:  Restriction of weight-bearing exercise  Weight loss  Compression stockings  Foam rubber  foot pads, or foam-fitting plastic heel cups  A consultation with a podiatrist may be helpful.  Intralesional corticosteroid injections have been documented to provide some relief for patients with piezogenic papules with underlying Leona-Danlos syndrome.  Surgical excision may be helpful if symptoms persist despite above managements but this is rarely necessary.    America to follow up with Primary Care provider regarding elevated blood pressure. (if equal or greater than 140/90)    BODY WEIGHT AND YOUR FEET  The following information is included in the after visit summary for all patients. Body weight can be a sensitive issue to discuss in clinic, but we think the following information is very important. Although we focus on the feet and ankles, we do support the overall health of our patients. Many things can cause foot and ankle problems. Foot structure, activity level, foot mechanics and injuries are common causes of pain. One very important issue that often goes unmentioned, is body weight. Extra weight can cause increased stress on muscles, ligaments, bones and tendons. Sometimes just a few extra pounds is all it takes to put one over her/his threshold. Without reducing that stress, it can be difficult to alleviate pain. As Foot & Ankle specialists, our job is addressing the lower extremity problem and possible causes. Regarding extra body weight, we encourage patients to discuss diet and weight management plans with their primary care doctors. It is this team approach that gives you the best opportunity for pain relief and getting you back on your feet. [facility] has a Comprehensive Weight Management Program. This program includes counseling, education, non-surgical and surgical approaches to weight loss. If you are interested in learning more either talk to you primary care provider or call [phone].

## 2020-10-07 ENCOUNTER — VIRTUAL VISIT (OUTPATIENT)
Dept: FAMILY MEDICINE | Facility: CLINIC | Age: 55
End: 2020-10-07
Payer: COMMERCIAL

## 2020-10-07 ENCOUNTER — TELEPHONE (OUTPATIENT)
Dept: FAMILY MEDICINE | Facility: CLINIC | Age: 55
End: 2020-10-07

## 2020-10-07 DIAGNOSIS — B37.83 ANGULAR CHEILITIS WITH CANDIDIASIS: Primary | ICD-10-CM

## 2020-10-07 PROCEDURE — 99213 OFFICE O/P EST LOW 20 MIN: CPT | Mod: GT | Performed by: NURSE PRACTITIONER

## 2020-10-07 RX ORDER — MULTIPLE VITAMINS W/ MINERALS TAB 9MG-400MCG
1 TAB ORAL DAILY
COMMUNITY

## 2020-10-07 RX ORDER — NYSTATIN AND TRIAMCINOLONE ACETONIDE 100000; 1 [USP'U]/G; MG/G
CREAM TOPICAL 2 TIMES DAILY
Qty: 15 G | Refills: 0 | Status: SHIPPED | OUTPATIENT
Start: 2020-10-07

## 2020-10-07 ASSESSMENT — ENCOUNTER SYMPTOMS
RESPIRATORY NEGATIVE: 1
CONSTITUTIONAL NEGATIVE: 1
WOUND: 0
EYES NEGATIVE: 1

## 2020-10-07 NOTE — TELEPHONE ENCOUNTER
Reason for call:  Symptom   Symptom or request: rash by cheek, possibly from mask    Duration (how long have symptoms been present): 3 weeks to a month  Have you been treated for this before? No    Additional comments: Wanted to speak to nurse first, before making appointment.    Phone number to reach patient:  Home number on file [phone] (home)    Best Time:  any    Can we leave a detailed message on this number?  YES    Travel screening: Negative

## 2020-10-07 NOTE — PATIENT INSTRUCTIONS
Please call us or mychart message if you do not improve with the cream and I will consider sending in treatment for Perioral dermatitis--oral antibiotics

## 2020-10-07 NOTE — PROGRESS NOTES
"America Woo is a 54 year old female who is being evaluated via a billable video visit.      The patient has been notified of following:     \"This video visit will be conducted via a call between you and your physician/provider. We have found that certain health care needs can be provided without the need for an in-person physical exam.  This service lets us provide the care you need with a video conversation.  If a prescription is necessary we can send it directly to your pharmacy.  If lab work is needed we can place an order for that and you can then stop by our lab to have the test done at a later time.    Video visits are billed at different rates depending on your insurance coverage.  Please reach out to your insurance provider with any questions.    If during the course of the call the physician/provider feels a video visit is not appropriate, you will not be charged for this service.\"    Patient has given verbal consent for Video visit? Yes  How would you like to obtain your AVS? Mail a copy  If you are dropped from the video visit, the video invite should be resent to: Text to cell phone: [phone]- Please use Doxcimity   Will anyone else be joining your video visit? No      Subjective     America Woo is a 54 year old female who presents today via video visit for the following health issues:    HPI     Rash  Onset/Duration: 1 month   Description  Location: Patient has rash on right side of mouth.   Character: bumpy red, thinks it looks like eczema but patient does not have eczema so she isn't positive.   Itching: mild  Intensity:  moderate  Progression of Symptoms:  waxing and waning  Accompanying signs and symptoms:   Fever: no  Body aches or joint pain: no  Sore throat symptoms: no  Recent cold symptoms: no  History:           Previous episodes of similar rash: None  New exposures:  Just masking.   Recent travel: no  Exposure to similar rash: no  Precipitating or alleviating factors: none "   Therapies tried and outcome: neosporin- not effective           Video Start Time: 2:12        Review of Systems   Constitutional: Negative.    HENT: Negative.    Eyes: Negative.    Respiratory: Negative.    Skin: Positive for rash. Negative for wound.            Objective           Vitals:  No vitals were obtained today due to virtual visit.    Physical Exam  Skin:     Findings: Rash present.      Comments: Erythematous maculopapular rash at the right corner of mouth extending slightly outward from lip.  No vesicular lesions. No drainage.  Is well demarcated with no surrounding erythema. Some rosacea changes noted on nose          GENERAL: Healthy, alert and no distress  EYES: Eyes grossly normal to inspection.  No discharge or erythema, or obvious scleral/conjunctival abnormalities.  RESP: No audible wheeze, cough, or visible cyanosis.  No visible retractions or increased work of breathing.    PSYCH: Mentation appears normal, affect normal/bright, judgement and insight intact, normal speech and appearance well-groomed.              Assessment & Plan   Problem List Items Addressed This Visit     None      Visit Diagnoses     Angular cheilitis with candidiasis    -  Primary    Relevant Medications    nystatin-triamcinolone (MYCOLOG II) 113693-5.1 UNIT/GM-% external cream         Increased use of mask and moisture associated with this may make candidal infection and inflammation in area near mouth.  Also may be perioral dermatitis        Patient Instructions   Please call us or Uversity message if you do not improve with the cream and I will consider sending in treatment for Perioral dermatitis--oral antibiotics    Return in about 3 months (around 1/7/2021) for Physical Exam.    ZOHAIB [facility]on [facility]      Video-Visit Details    Type of service:  Video Visit    Video End Time:2:20    Originating Location (pt. Location): Home    Distant Location (provider location):  [facility]  [facility]     Platform used for Video Visit: Malcolm

## 2020-10-07 NOTE — TELEPHONE ENCOUNTER
Called the pt back.      She has a rash close to her mouth on her cheek.  She says she has had it for 3 weeks to a month.  It is not very big, it just does not look very good.  It does not itch.  It is like rashy.  It does not sting or burn.      Advised to have some kind of appt.  Video visit was scheduled.

## 2020-12-04 ENCOUNTER — MYC MEDICAL ADVICE (OUTPATIENT)
Dept: FAMILY MEDICINE | Facility: CLINIC | Age: 55
End: 2020-12-04

## 2020-12-04 DIAGNOSIS — L71.0 PERIORAL DERMATITIS: Primary | ICD-10-CM

## 2020-12-09 RX ORDER — TETRACYCLINE HYDROCHLORIDE 500 MG/1
500 CAPSULE ORAL 2 TIMES DAILY
Qty: 28 CAPSULE | Refills: 1 | Status: SHIPPED | OUTPATIENT
Start: 2020-12-09

## 2020-12-10 NOTE — TELEPHONE ENCOUNTER
Please call pt and let her know that I sent an antibiotic for her.  It can be used for 2 weeks.  If the rash comes back at another time there is a refill.  If the rash fails to clear up she should follow up in clinic

## 2020-12-16 ENCOUNTER — MYC MEDICAL ADVICE (OUTPATIENT)
Dept: FAMILY MEDICINE | Facility: CLINIC | Age: 55
End: 2020-12-16

## 2020-12-27 ENCOUNTER — HEALTH MAINTENANCE LETTER (OUTPATIENT)
Age: 55
End: 2020-12-27

## 2020-12-29 ENCOUNTER — MYC MEDICAL ADVICE (OUTPATIENT)
Dept: FAMILY MEDICINE | Facility: CLINIC | Age: 55
End: 2020-12-29

## 2021-01-14 NOTE — TELEPHONE ENCOUNTER
Pt was advised to make a follow up appointment either virtual or in person prior to leaving for [place].  Pt did not schedule this visit. Will send a my chart to pt as she would need to do an E visit if she is out of the state instead of doing a virtual visit with provider directly

## 2021-03-30 NOTE — NURSING NOTE
Chief Complaint   Patient presents with     Consult     Chronic Headache     Desiree Garcia     LM to call office to schedule an appointment.

## 2021-10-09 ENCOUNTER — HEALTH MAINTENANCE LETTER (OUTPATIENT)
Age: 56
End: 2021-10-09

## 2022-01-29 ENCOUNTER — HEALTH MAINTENANCE LETTER (OUTPATIENT)
Age: 57
End: 2022-01-29

## 2022-05-21 ENCOUNTER — HEALTH MAINTENANCE LETTER (OUTPATIENT)
Age: 57
End: 2022-05-21

## 2022-07-28 NOTE — MR AVS SNAPSHOT
After Visit Summary   7/31/2018    America Woo    MRN: [id number]           Patient Information     Date Of Birth          1965        Visit Information        Provider Department      7/31/2018 1:30 PM Анна Raza MD [facility]        Today's Diagnoses     Intractable chronic migraine without aura and without status migrainosus    -  1    Anxiety          Care Instructions    Keep headache journal of your symptoms. Try to track how often you have a headache, how long it lasts and what medicines you used. You can also track severity of headache    You can use smart phone apps: my migraine enrique or use paper.     When you use a triptan medicine (rizatriptan)  take as soon as you notice the migraine begins. You can take a second dose 2 hours after the first dose if you still have any migraine symptoms    It is fine to take 600 mg of ibuprofen (Advil) or 440 mg of naproxen (Aleve) with the triptan medicine    Try not to use triptan medicines more than 2 days a week    Lifestyle changes you can make to help your headaches:  1. Try to drink enough water each day (48-70 fluid ounces a day)  2. Limit caffeine intact-one caffeinated beverage a day  3. Eat regular meals  4. Try to get cardiovascular exercise (walking, swimming, biking) 4-5 times a week  5. Try to have a routine sleep schedule going to bed at the same time each night and getting up the same time each morning with enough time to sleep in between  6. Sometimes foods can trigger migraines you can try to eliminate them if you think they make symptoms worse: dairy, gluten, nuts (cashews, almonds), artificial sweeteners, artificial colors, high sugar content foods, certain alcohol, chocolate.    To learn more about headaches you can go to the following websites:  https://americanmigrainefoundation.org/  http://www.headaches.org/            Follow-ups after your visit        Follow-up notes from your care team      Physical Therapy Evaluation    Referred by: Liz Echols PA-C; Medical Diagnosis (from order):    Diagnosis Information      Diagnosis    724.2 (ICD-9-CM) - M54.50 (ICD-10-CM) - Acute right-sided low back pain without sciatica              Visit: 1    Visit Type: Initial Evaluation  Treatment Diagnosis: lumbar, symptoms with increased pain/symptoms, impaired posture, impaired range of motion, impaired muscle length/flexibility, impaired joint play/mobility, impaired strength, impaired tissue mobility, impaired activity tolerance  Date of onset: 3/28/2022  Diagnosis Precautions: none  Chart reviewed at time of initial evaluation (relevant co-morbidities, allergies, tests and medications listed): anxiety  Diagnostic Tests: X-ray: reviewed.      SUBJECTIVE                                                                                                               At eval: patient reports in the last year she started noticing a pinch on the lower R side of her back when she was in a certain position or with certain movements. In March was picking up her boys from day care and reached for something and got a shooting sensation up the R side of her back. States she could barely lean over to get her son in the car seat and was in pain on the way home. Pain persisted for the next couple of days and nothing helped. Went to immediate care after about 3 days and they did an x-ray and was not remarkable. Was given a steroid pack and that helped a lot and the pain was manageable. The pain has persisted and is \"just there\". Always worse upon waking in the morning, bending forward, lifting, transition sit to stand. Walking, sitting and standing are fine. Some days are worse than others. Denies N/T or radicular pain. Has 2 boys, 5 and 3 yo. Patient is a teacher, [employer]. Sudden onset but no known injury.  Pain / Symptoms:  Pain/symptom is: intermittent  Pain rating (out of 10): ; Best: 0; Worst: 8  Location: 5/10  "Return in about 3 months (around 10/31/2018) for Routine Visit.      Who to contact     Please call your clinic at [phone] to:    Ask questions about your health    Make or cancel appointments    Discuss your medicines    Learn about your test results    Speak to your doctor            Additional Information About Your Visit        MyChart Information     transOMICt is an electronic gateway that provides easy, online access to your medical records. With BuyVIP, you can request a clinic appointment, read your test results, renew a prescription or communicate with your care team.     To sign up for BuyVIP visit the website at [email or web address]   You will be asked to enter the access code listed below, as well as some personal information. Please follow the directions to create your username and password.     Your access code is: [email or web address]  Expires: 10/23/2018  6:31 AM     Your access code will  in 90 days. If you need help or a new code, please contact your [facility] or call [phone] for assistance.        Care EveryWhere ID     This is your Care EveryWhere ID. This could be used by other organizations to access your [facility] medical records  [id number]        Your Vitals Were     Pulse Height BMI (Body Mass Index)             92 1.702 m (5' 7\") 23.49 kg/m2          Blood Pressure from Last 3 Encounters:   18 122/59   11/15/17 127/73    Weight from Last 3 Encounters:   18 68 kg (150 lb)   11/15/17 70.4 kg (155 lb 3.2 oz)              Today, you had the following     No orders found for display         Today's Medication Changes          These changes are accurate as of 18  2:37 PM.  If you have any questions, ask your nurse or doctor.               Start taking these medicines.        Dose/Directions    nortriptyline 10 MG capsule   Commonly known as:  PAMELOR   Used for:  Intractable chronic migraine without aura and without status " average  Quality / Description: sharp. Pinch  Alleviating Factors: over-the-counter medication. Standing or prone extension, child's pose    Function:     Prior Level of Function: declining function, therefore referred to therapy,  Patient Goals: decreased pain    Prior treatment: no therapies  Discharged from hospital, home health, or skilled nursing facility in last 30 days: no    Home Environment:   Patient lives with significant other. children  Type of home: multiple level home  Assistance available: as needed  Feels safe at home/work/school.  2 or more falls or an unexplained fall with injury in the last year:  No    OBJECTIVE                                                                                                                     Observation:   Comments / Details: Posure: decr lumbar curve, L inominate and shoulder elevated, R lateral trunk lean    SLS: L and R static WNL    Lumbar AROM:  Flex: mod decr, R LB:  Ext: WNL  R SB: WNL  L SB: min decr, R LBP    Hip MMT; 5/5 throughout and painfree    Palpation:  Mild - mod hypertonicity of R lumbar paraspinals and B glute med with mild TTP glute med  decr joint mobility R L4-5 with TTP    Flexibility:  HS: B min decr  Piriformis: B min decr    SLR, slump, SI compression and distraction all negative B           Outcome Measures:   OSWESTRY Total Scored: 5  OSWESTRY Total Possible Score: 50  OSWESTRY Score Calculated: 10 %  (0-20% = minimal disability; 20-40% = moderate disability; 40-60% = severe disability; 60-80% = crippled; % = bed bound) see flowsheet for additional documentation      TREATMENT                                                                                                                  Therapeutic Exercise:  H/L TrA pull in 20x*  Prone press up 10x*  Standing lumbar extension 10x*    Home Exercise Program/Education Materials: *above indicates provided as part of home exercise program    Emailed, [email or web address]     ASSESSMENT                                                                                                            39 year old female patient has reported functional limitations listed above impacted by signs and symptoms consistent with below   • Involved: lumbar   • Symptoms/impairments: increased pain/symptoms, impaired posture, impaired range of motion, impaired muscle length/flexibility, impaired joint play/mobility, impaired strength, impaired tissue mobility and impaired activity tolerance  Prognosis: patient will benefit from skilled therapy  Rehabilitative potential is: very good     • Predicted patient presentation: Low (stable) - Patient comorbidities and complexities, as defined above, will have little effect on progress for prescribed plan of care.  Patient Education:   Who will be receiving education: patient, patient's significant other and patient's parent(s)  Are they ready to learn: yes  Preferred learning style: written, verbal and demonstration  Barriers to learning: no barriers apparent at this time  Results of above outlined education: Verbalizes understanding and Demonstrates understanding      PLAN                                                                                                                         The following skilled interventions to be implemented to achieve goals listed below:Manual Therapy (95033)  Neuromuscular Re-Education (43881)  Therapeutic Activity (33954)  Therapeutic Exercise (99814)  Electrical Stimulation (unattended, 22727 or )  Heat/Cold (12785)  Ultrasound/Phonophoresis (55056)  Frequency / Duration: 1 times per week tapering as patient progresses for an estimated total of 12 visits for 12 weeks    Patient involved in and agreed to plan of care and goals.  Suggestions for next session as indicated: Progress per plan of care  Plan: core stabilization, extension biased lumbar mobility, manual Rx, cat cow   Problems: none foreseen      GOALS                               migrainosus, Anxiety   Started by:  Анна Raza MD        Dose:  10 mg   Take 1 capsule (10 mg) by mouth At Bedtime   Quantity:  30 capsule   Refills:  11       rizatriptan 10 MG tablet   Commonly known as:  MAXALT   Used for:  Intractable chronic migraine without aura and without status migrainosus   Started by:  Анна Raza MD        Dose:  10 mg   Take 1 tablet (10 mg) by mouth at onset of headache for migraine May repeat in 2 hours. Max 3 tablets/24 hours.   Quantity:  9 tablet   Refills:  3            Where to get your medicines      These medications were sent to [facility] [address] [place] [address] [address] [facility] [address] [facility] [address] [facility] [address]    Hours:  24-hours Phone:  [phone]     nortriptyline 10 MG capsule    rizatriptan 10 MG tablet                Primary Care Provider Office Phone # Fax #    Therese Oviedo [phone]       [facility] [address] [facility] [address]        Equal Access to Services     [facility] : Hadii kailey tucker hadasho Soomaali, waaxda luqadaha, qaybta kaalmada ademeena, karen mendes . So [facility] [phone].    ATENCIÓN: Si habla español, tiene a negron disposición servicios gratuitos de asistencia lingüística. [facility] [phone].    We comply with applicable federal civil rights laws and [place] laws. We do not discriminate on the basis of race, color, national origin, age, disability, sex, sexual orientation, or gender identity.            Thank you!     Thank you for choosing [facility]  for your care. Our goal is always to provide you with excellent care. Hearing back from our patients is one way we can continue to improve our services. Please take a few minutes to complete the written survey that you may receive in the mail after your visit with us. Thank you!             Your Updated Medication List - Protect others around you:                                                                                               Long Term Goals: to be met by end of plan of care  1. Decreased lumbar pain from 0-8/10 with 5/10 average to no more than 0-2/10 and incr ease and comfort of transferring out of bed  2. Improve lumbar AROM to WNL in all planes and able to bend and reach to retrieve item with min to no incr symptoms  3. Consistently able to recruit TrA with functional tasks and demonstrates 5/5 B hip MMT throughout and able to lift mod weight objects with good tolerance and good mechanics  4. Patient will be independent with progressed and modified home exercise program.      Therapy procedure time and total treatment time can be found documented on the Time Entry flowsheet   Learn how to safely use, store and throw away your medicines at www.disposemymeds.org.          This list is accurate as of 7/31/18  2:37 PM.  Always use your most recent med list.                   Brand Name Dispense Instructions for use Diagnosis    ibuprofen 200 MG tablet    ADVIL/MOTRIN     Take 400 mg by mouth every 8 hours as needed for mild pain        MULTIVITAMIN & MINERAL PO      Take 1 tablet by mouth three times a week        nortriptyline 10 MG capsule    PAMELOR    30 capsule    Take 1 capsule (10 mg) by mouth At Bedtime    Intractable chronic migraine without aura and without status migrainosus, Anxiety       rizatriptan 10 MG tablet    MAXALT    9 tablet    Take 1 tablet (10 mg) by mouth at onset of headache for migraine May repeat in 2 hours. Max 3 tablets/24 hours.    Intractable chronic migraine without aura and without status migrainosus       Vitamin D-3 1000 units Caps      Take 1 capsule by mouth four times a week

## 2022-09-17 ENCOUNTER — HEALTH MAINTENANCE LETTER (OUTPATIENT)
Age: 57
End: 2022-09-17

## 2023-07-06 NOTE — TELEPHONE ENCOUNTER
This is a duplicate message.   Will address concern in other message    Sarecycline Counseling: Patient advised regarding possible photosensitivity and discoloration of the teeth, skin, lips, tongue and gums.  Patient instructed to avoid sunlight, if possible.  When exposed to sunlight, patients should wear protective clothing, sunglasses, and sunscreen.  The patient was instructed to call the office immediately if the following severe adverse effects occur:  hearing changes, easy bruising/bleeding, severe headache, or vision changes.  The patient verbalized understanding of the proper use and possible adverse effects of sarecycline.  All of the patient's questions and concerns were addressed.

## 2023-10-07 ENCOUNTER — HEALTH MAINTENANCE LETTER (OUTPATIENT)
Age: 58
End: 2023-10-07